# Patient Record
Sex: FEMALE | Race: WHITE | Employment: FULL TIME | URBAN - NONMETROPOLITAN AREA
[De-identification: names, ages, dates, MRNs, and addresses within clinical notes are randomized per-mention and may not be internally consistent; named-entity substitution may affect disease eponyms.]

---

## 2017-02-28 ENCOUNTER — TELEPHONE (OUTPATIENT)
Dept: ENDOCRINOLOGY | Age: 32
End: 2017-02-28

## 2019-01-31 ENCOUNTER — OFFICE VISIT (OUTPATIENT)
Dept: FAMILY MEDICINE CLINIC | Age: 34
End: 2019-01-31
Payer: MEDICARE

## 2019-01-31 VITALS
DIASTOLIC BLOOD PRESSURE: 72 MMHG | RESPIRATION RATE: 18 BRPM | TEMPERATURE: 98 F | BODY MASS INDEX: 21.99 KG/M2 | SYSTOLIC BLOOD PRESSURE: 112 MMHG | WEIGHT: 112 LBS | HEART RATE: 89 BPM | HEIGHT: 60 IN | OXYGEN SATURATION: 99 %

## 2019-01-31 DIAGNOSIS — B96.89 ACUTE BACTERIAL SINUSITIS: Primary | ICD-10-CM

## 2019-01-31 DIAGNOSIS — J01.90 ACUTE BACTERIAL SINUSITIS: Primary | ICD-10-CM

## 2019-01-31 PROCEDURE — G8420 CALC BMI NORM PARAMETERS: HCPCS | Performed by: NURSE PRACTITIONER

## 2019-01-31 PROCEDURE — 1036F TOBACCO NON-USER: CPT | Performed by: NURSE PRACTITIONER

## 2019-01-31 PROCEDURE — G8484 FLU IMMUNIZE NO ADMIN: HCPCS | Performed by: NURSE PRACTITIONER

## 2019-01-31 PROCEDURE — G8427 DOCREV CUR MEDS BY ELIG CLIN: HCPCS | Performed by: NURSE PRACTITIONER

## 2019-01-31 PROCEDURE — 99203 OFFICE O/P NEW LOW 30 MIN: CPT | Performed by: NURSE PRACTITIONER

## 2019-01-31 RX ORDER — BUPRENORPHINE HYDROCHLORIDE, NALOXONE HYDROCHLORIDE 8; 2 MG/1; MG/1
FILM, SOLUBLE BUCCAL; SUBLINGUAL
Refills: 0 | COMMUNITY
Start: 2018-11-12

## 2019-01-31 RX ORDER — PHENOL 1.4 %
AEROSOL, SPRAY (ML) MUCOUS MEMBRANE
COMMUNITY

## 2019-01-31 RX ORDER — AMOXICILLIN AND CLAVULANATE POTASSIUM 875; 125 MG/1; MG/1
1 TABLET, FILM COATED ORAL 2 TIMES DAILY
Qty: 14 TABLET | Refills: 0 | Status: SHIPPED | OUTPATIENT
Start: 2019-01-31 | End: 2019-02-07

## 2019-01-31 RX ORDER — IBUPROFEN 800 MG/1
800 TABLET ORAL EVERY 6 HOURS PRN
Refills: 0 | COMMUNITY
Start: 2018-12-21 | End: 2022-05-10 | Stop reason: SDUPTHER

## 2019-01-31 RX ORDER — LEVOTHYROXINE SODIUM 0.07 MG/1
75 TABLET ORAL DAILY
COMMUNITY
End: 2021-09-20 | Stop reason: SDUPTHER

## 2019-01-31 ASSESSMENT — ENCOUNTER SYMPTOMS
EYE PAIN: 0
COUGH: 1
SORE THROAT: 1
TROUBLE SWALLOWING: 0
CHEST TIGHTNESS: 1
ABDOMINAL DISTENTION: 0
EYE DISCHARGE: 1
RHINORRHEA: 1
WHEEZING: 0
EYE DISCHARGE: 0
SHORTNESS OF BREATH: 0
VOMITING: 0
DIARRHEA: 0
NAUSEA: 0
VOICE CHANGE: 0
EYE ITCHING: 0
EYE REDNESS: 0
COLOR CHANGE: 0
FACIAL SWELLING: 0
SINUS PRESSURE: 1
SINUS PAIN: 1
ABDOMINAL PAIN: 0
RECTAL PAIN: 0
CONSTIPATION: 0
NAUSEA: 1

## 2019-01-31 ASSESSMENT — PATIENT HEALTH QUESTIONNAIRE - PHQ9
2. FEELING DOWN, DEPRESSED OR HOPELESS: 0
1. LITTLE INTEREST OR PLEASURE IN DOING THINGS: 0
SUM OF ALL RESPONSES TO PHQ QUESTIONS 1-9: 0
SUM OF ALL RESPONSES TO PHQ9 QUESTIONS 1 & 2: 0
SUM OF ALL RESPONSES TO PHQ QUESTIONS 1-9: 0

## 2021-08-31 ENCOUNTER — VIRTUAL VISIT (OUTPATIENT)
Dept: FAMILY MEDICINE CLINIC | Age: 36
End: 2021-08-31
Payer: MEDICARE

## 2021-08-31 ENCOUNTER — NURSE TRIAGE (OUTPATIENT)
Dept: OTHER | Facility: CLINIC | Age: 36
End: 2021-08-31

## 2021-08-31 DIAGNOSIS — R52 BODY ACHES: ICD-10-CM

## 2021-08-31 DIAGNOSIS — R05.9 COUGH: Primary | ICD-10-CM

## 2021-08-31 PROCEDURE — G8421 BMI NOT CALCULATED: HCPCS | Performed by: NURSE PRACTITIONER

## 2021-08-31 PROCEDURE — 1036F TOBACCO NON-USER: CPT | Performed by: NURSE PRACTITIONER

## 2021-08-31 PROCEDURE — 99214 OFFICE O/P EST MOD 30 MIN: CPT | Performed by: NURSE PRACTITIONER

## 2021-08-31 PROCEDURE — G8427 DOCREV CUR MEDS BY ELIG CLIN: HCPCS | Performed by: NURSE PRACTITIONER

## 2021-08-31 RX ORDER — AZITHROMYCIN 250 MG/1
250 TABLET, FILM COATED ORAL SEE ADMIN INSTRUCTIONS
Qty: 6 TABLET | Refills: 0 | Status: SHIPPED | OUTPATIENT
Start: 2021-08-31 | End: 2021-09-05

## 2021-08-31 ASSESSMENT — PATIENT HEALTH QUESTIONNAIRE - PHQ9
SUM OF ALL RESPONSES TO PHQ QUESTIONS 1-9: 0
2. FEELING DOWN, DEPRESSED OR HOPELESS: 0
1. LITTLE INTEREST OR PLEASURE IN DOING THINGS: 0
SUM OF ALL RESPONSES TO PHQ9 QUESTIONS 1 & 2: 0

## 2021-08-31 ASSESSMENT — ENCOUNTER SYMPTOMS: COUGH: 1

## 2021-08-31 NOTE — LETTER
25 [facility] [address]  Phone: [phone]  Fax: [phone] [facility], APRN - CNP        August 31, 2021     Patient: Sukhwinder Gary   YOB: 1985   Date of Visit: 8/31/2021       To Whom It May Concern: It is my medical opinion that Chrissie Patel should remain out of work until [employer] are recieved. If you have any questions or concerns, please don't hesitate to call.     Sincerely,        Mena Pruitt, APRN - CNP

## 2021-08-31 NOTE — PROGRESS NOTES
[address] [address]  Dept: [phone]  Dept Fax: [phone]  Loc: [phone]      Sukhwinder Gary is a 29 y.o. female who presents todayfor Fever (x5 days), Congestion, and Fatigue      HPI:      Has been sick since Thursday took Zycam    Sunday felt worse, bodyaches, weakness. Started with sinus symptoms, now in chest.     Gets winded with increased activity    Had covid in December. Fever and chills. The patient is allergic to codeine and toradol [ketorolac tromethamine]. Past MedicalHistory  [facility]  has a past medical history of Fibromyalgia, Hashimoto's thyroiditis, Hypothyroidism, Kidney stone, Neuropathy, and Scoliosis. Medications    Current Outpatient Medications:     azithromycin (ZITHROMAX) 250 MG tablet, Take 1 tablet by mouth See Admin Instructions for 5 days 500mg on day 1 followed by 250mg on days 2 - 5, Disp: 6 tablet, Rfl: 0    levothyroxine (SYNTHROID) 75 MCG tablet, Take 75 mcg by mouth Daily, Disp: , Rfl:     ibuprofen (ADVIL;MOTRIN) 800 MG tablet, TALE 1 TABLET BY MOUTH THREE TIMES A DAY WITH FOOD, Disp: , Rfl: 0    SUBOXONE 8-2 MG FILM SL film, , Disp: , Rfl: 0    Multiple Vitamins-Minerals (MULTIVITAMIN WOMEN) TABS, Take by mouth, Disp: , Rfl:     Subjective:      Review of Systems   Constitutional: Positive for fever. HENT: Positive for congestion. Respiratory: Positive for cough. Gastrointestinal: Negative for diarrhea, nausea and vomiting. Musculoskeletal: Positive for myalgias. Skin: Negative for rash. Objective: There were no vitals filed for this visit. Physical Exam  Vitals and nursing note reviewed. Constitutional:       General: She is not in acute distress. Appearance: Normal appearance. She is well-developed. She is not diaphoretic. HENT:      Head: Normocephalic and atraumatic.       Right Ear: Tympanic membrane and external ear normal. Tympanic membrane is not injected or erythematous. Left Ear: Tympanic membrane and external ear normal. Tympanic membrane is not injected or erythematous. Nose: Congestion present. Mouth/Throat:      Pharynx: Oropharynx is clear. Uvula midline. Eyes:      General:         Right eye: No discharge. Left eye: No discharge. Conjunctiva/sclera: Conjunctivae normal.      Pupils: Pupils are equal, round, and reactive to light. Neck:      Thyroid: No thyromegaly. Trachea: Trachea normal.   Cardiovascular:      Rate and Rhythm: Normal rate and regular rhythm. Pulses: Normal pulses. Heart sounds: Normal heart sounds, S1 normal and S2 normal. No murmur heard. No friction rub. No gallop. Pulmonary:      Effort: Pulmonary effort is normal. No respiratory distress. Breath sounds: Normal breath sounds. No wheezing or rales. Comments: Harsh cough heard on exam    Chest:      Chest wall: No tenderness. Abdominal:      General: Bowel sounds are normal. There is no distension. Palpations: Abdomen is soft. There is no mass. Tenderness: There is no abdominal tenderness. There is no guarding or rebound. Musculoskeletal:         General: No tenderness or deformity. Normal range of motion. Cervical back: Full passive range of motion without pain, normal range of motion and neck supple. Lymphadenopathy:      Cervical: No cervical adenopathy. Skin:     General: Skin is warm and dry. Capillary Refill: Capillary refill takes less than 2 seconds. Neurological:      Mental Status: She is alert and oriented to person, place, and time. Deep Tendon Reflexes: Reflexes are normal and symmetric. Psychiatric:         Mood and Affect: Mood normal.         Behavior: Behavior normal.         Assessment/Plan: Alphonse Hussein was seen today for fever, congestion and fatigue.     Diagnoses and all orders for this visit:    Cough  -     COVID-19; Future  - azithromycin (ZITHROMAX) 250 MG tablet; Take 1 tablet by mouth See Admin Instructions for 5 days 500mg on day 1 followed by 250mg on days 2 - 5  -     COVID-19    Body aches  -     COVID-19; Future  -     COVID-19    Other orders  -     COVID-19, Rapid      Pt non toxic appearing and in no acute distress. Rx for zithromax  Advised to quarantine until results are received. Patient instructed to increase fluids and rest. Use Tylenol for fever or pain control. Good hand washing and Mask wearing encouraged. Monitor oxygen level with pulse oxymetry if able to obtain one, Pulse Ox should be above 92%. If SOB or difficulty breathing, go to the ER. Return in about 1 week (around 9/7/2021), or if symptoms worsen or fail to improve. Patient instructions given and reviewed.     Electronicallysigned by ERASMO Sosa CNP on 9/2/2021 at 3:49 PM

## 2021-08-31 NOTE — PATIENT INSTRUCTIONS
Patient Education        Learning About Coronavirus [phone])  What is coronavirus (COVID-19)? COVID-19 is a disease caused by a new type of coronavirus. This illness was first found in December 2019. It has since spread worldwide. Coronaviruses are a large group of viruses. They cause the common cold. They also cause more serious illnesses like Middle East respiratory syndrome (MERS) and severe acute respiratory syndrome (SARS). COVID-19 is caused by a novel coronavirus. That means it's a new type that has not been seen in people before. What are the symptoms? Coronavirus (COVID-19) symptoms may include:  · Fever. · Cough. · Trouble breathing. · Chills or repeated shaking with chills. · Muscle pain. · Headache. · Sore throat. · New loss of taste or smell. · Vomiting. · Diarrhea. In severe cases, COVID-19 can cause pneumonia and make it hard to breathe without help from a machine. It can cause death. How is it diagnosed? COVID-19 is diagnosed with a viral test. This may also be called a PCR test or antigen test. It looks for evidence of the virus in your breathing passages or lungs (respiratory system). The test is most often done on a sample from the nose, throat, or lungs. It's sometimes done on a sample of saliva. One way a sample is collected is by putting a long swab into the back of your nose. How is it treated? Mild cases of COVID-19 can be treated at home. Serious cases need treatment in the hospital. Treatment may include medicines to reduce symptoms, plus breathing support such as oxygen therapy or a ventilator. Some people may be placed on their belly to help their oxygen levels. Treatments that may help people who have COVID-19 include:  Antiviral medicines. These medicines treat viral infections. Remdesivir is an example. Immune-based therapy. These medicines help the immune system fight COVID-19. One example is bamlanivimab. It's a monoclonal antibody. Blood thinners. These medicines help prevent blood clots. People with severe illness are at risk for blood clots. How can you protect yourself and others? The best way to protect yourself from getting sick is to:  · Avoid areas where there is an outbreak. · Avoid contact with people who may be infected. · Avoid crowds and try to stay at least 6 feet away from other people. · Wash your hands often, especially after you cough or sneeze. Use soap and water, and scrub for at least 20 seconds. If soap and water aren't available, use an alcohol-based hand . · Avoid touching your mouth, nose, and eyes. To help avoid spreading the virus to others:  · Stay home if you are sick or have been exposed to the virus. Don't go to school, work, or public areas. And don't use public transportation, ride-shares, or taxis unless you have no choice. · Wear a cloth face cover if you have to go to public areas. · Cover your mouth with a tissue when you cough or sneeze. Then throw the tissue in the trash and wash your hands right away. · If you're sick:  ? Leave your home only if you need to get medical care. But call the doctor's office first so they know you're coming. And wear a face cover. ? Wear the face cover whenever you're around other people. It can help stop the spread of the virus. ? Limit contact with pets and people in your home. If possible, stay in a separate bedroom and use a separate bathroom. ? Clean and disinfect your home every day. Use household  and disinfectant wipes or sprays. Take special care to clean things that you grab with your hands. These include doorknobs, remote controls, phones, and handles on your refrigerator and microwave. And don't forget countertops, tabletops, bathrooms, and computer keyboards. When should you call for help? Call 911 anytime you think you may need emergency care.  For example, call if you have life-threatening symptoms, such as:    · You have severe trouble breathing. (You can't talk at all.)     · You have constant chest pain or pressure.     · You are severely dizzy or lightheaded.     · You are confused or can't think clearly.     · Your face and lips have a blue color.     · You pass out (lose consciousness) or are very hard to wake up. Call your doctor now or seek immediate medical care if:    · You have moderate trouble breathing. (You can't speak a full sentence.)     · You are coughing up blood (more than about 1 teaspoon).     · You have signs of low blood pressure. These include feeling lightheaded; being too weak to stand; and having cold, pale, clammy skin. Watch closely for changes in your health, and be sure to contact your doctor if:    · Your symptoms get worse.     · You are not getting better as expected. Call before you go to the doctor's office. Follow their instructions. And wear a cloth face cover. Current as of: March 26, 2021               Content Version: 12.9  © 2006-2021 [employer]. Care instructions adapted under license by [employer] . If you have questions about a medical condition or this instruction, always ask your healthcare professional. [place] [address] any warranty or liability for your use of this information. Patient Education        Coronavirus (YIAFK-21): Care Instructions  Overview  The coronavirus disease (COVID-19) is caused by a virus. Symptoms may include a fever, a cough, and shortness of breath. It mainly spreads person-to-person through droplets from coughing and sneezing. The virus also can spread when people are in close contact with someone who is infected. Most people have mild symptoms and can take care of themselves at home. If their symptoms get worse, they may need care in a hospital. Treatment may include medicines to reduce symptoms, plus breathing support such as oxygen therapy or a ventilator. It's important to not spread the virus to others.  If you have COVID-19, wear a face cover anytime you are around other people. It can help stop the spread of the virus. You need to isolate yourself while you are sick. Leave your home only if you need to get medical care or testing. Follow-up care is a key part of your treatment and safety. Be sure to make and go to all appointments, and call your doctor if you are having problems. It's also a good idea to know your test results and keep a list of the medicines you take. How can you care for yourself at home? · Get extra rest. It can help you feel better. · Drink plenty of fluids. This helps replace fluids lost from fever. Fluids also help ease a scratchy throat. Water, soup, fruit juice, and hot tea with lemon are good choices. · Take acetaminophen (such as Tylenol) to reduce a fever. It may also help with muscle aches. Read and follow all instructions on the label. · Use petroleum jelly on sore skin. This can help if the skin around your nose and lips becomes sore from rubbing a lot with tissues. If you use oxygen, use a water-based product instead of petroleum jelly. Tips for self-isolation  · Limit contact with people in your home. If possible, stay in a separate bedroom and use a separate bathroom. · Wear a cloth face cover when you are around other people. It can help stop the spread of the virus when you cough or sneeze. · If you have to leave home, avoid crowds and try to stay at least 6 feet away from other people. · Avoid contact with pets and other animals. · Cover your mouth and nose with a tissue when you cough or sneeze. Then throw it in the trash right away. · Wash your hands often, especially after you cough or sneeze. Use soap and water, and scrub for at least 20 seconds. If soap and water aren't available, use an alcohol-based hand . · Don't share personal household items. These include bedding, towels, cups and glasses, and eating utensils.   · [address] in the warmest water allowed 2021               Content Version: 12.9  © 0032-2087 [employer]. Care instructions adapted under license by [facility] ([facility]). If you have questions about a medical condition or this instruction, always ask your healthcare professional. [address] any warranty or liability for your use of this information.

## 2021-08-31 NOTE — TELEPHONE ENCOUNTER
Received call from [facility] with [employer]. Brief description of triage: fever, body aches, HA, and fatigue    Triage indicates for patient to be seen today or tomorrow    Care advice provided, patient verbalizes understanding; denies any other questions or concerns; instructed to call back for any new or worsening symptoms. Writer provided warm transfer to [facility] at [facility] for appointment scheduling. Attention Provider: Thank you for allowing me to participate in the care of your patient. The patient was connected to triage in response to information provided to the [facility]. Please do not respond through this encounter as the response is not directed to a shared pool. Reason for Disposition   Fever present > 3 days (72 hours)    Additional Information   Negative: Headache and stiff neck (can't touch chin to chest)     Yes to headache, denies stiff neck   Negative: Difficulty breathing     Feels winded when going up stairs but denies SOB    Answer Assessment - Initial Assessment Questions  1. TEMPERATURE: \"What is the most recent temperature? \"  \"How was it measured? \"       101 yesterday    2. ONSET: \"When did the fever start? \"       Last Friday    3. SYMPTOMS: \"Do you have any other symptoms besides the fever? \"  (e.g., colds, headache, sore throat, earache, cough, rash, diarrhea, vomiting, abdominal pain)      Fatigue, body aches    4. CAUSE: If there are no symptoms, ask: \"What do you think is causing the fever? \"       Pneumonia or COVID    5. CONTACTS: \"Does anyone else in the family have an infection? \"      Denies    6. TREATMENT: \"What have you done so far to treat this fever? \" (e.g., medications)      Tylenol and Motrin    7. IMMUNOCOMPROMISE: \"Do you have of the following: diabetes, HIV positive, splenectomy, cancer chemotherapy, chronic steroid treatment, transplant patient, etc.\"      Denies    8. PREGNANCY: \"Is there any chance you are pregnant? \" \"When was your last menstrual period? \"      Denies    9. TRAVEL: \"Have you traveled out of the country in the last month? \" (e.g., travel history, exposures)      Denies    Protocols used:  FEVER-ADULT-[place]

## 2021-09-02 LAB
SARS-COV-2: NOT DETECTED
SOURCE: NORMAL

## 2021-09-02 ASSESSMENT — ENCOUNTER SYMPTOMS
VOMITING: 0
DIARRHEA: 0
NAUSEA: 0

## 2021-09-13 ENCOUNTER — NURSE TRIAGE (OUTPATIENT)
Dept: OTHER | Facility: CLINIC | Age: 36
End: 2021-09-13

## 2021-09-13 ENCOUNTER — OFFICE VISIT (OUTPATIENT)
Dept: FAMILY MEDICINE CLINIC | Age: 36
End: 2021-09-13
Payer: MEDICARE

## 2021-09-13 VITALS
DIASTOLIC BLOOD PRESSURE: 74 MMHG | BODY MASS INDEX: 22.38 KG/M2 | WEIGHT: 114.6 LBS | RESPIRATION RATE: 16 BRPM | SYSTOLIC BLOOD PRESSURE: 110 MMHG | HEART RATE: 72 BPM | OXYGEN SATURATION: 98 %

## 2021-09-13 DIAGNOSIS — R07.9 CHEST PAIN, UNSPECIFIED TYPE: ICD-10-CM

## 2021-09-13 DIAGNOSIS — R05.9 COUGH: ICD-10-CM

## 2021-09-13 DIAGNOSIS — Z11.4 SCREENING FOR HIV (HUMAN IMMUNODEFICIENCY VIRUS): ICD-10-CM

## 2021-09-13 DIAGNOSIS — E06.3 HYPOTHYROIDISM DUE TO HASHIMOTO'S THYROIDITIS: ICD-10-CM

## 2021-09-13 DIAGNOSIS — Z11.59 NEED FOR HEPATITIS C SCREENING TEST: ICD-10-CM

## 2021-09-13 DIAGNOSIS — R06.00 DYSPNEA, UNSPECIFIED TYPE: ICD-10-CM

## 2021-09-13 DIAGNOSIS — Z13.220 SCREENING, LIPID: ICD-10-CM

## 2021-09-13 DIAGNOSIS — J20.9 ACUTE BRONCHITIS, UNSPECIFIED ORGANISM: ICD-10-CM

## 2021-09-13 DIAGNOSIS — Z11.52 ENCOUNTER FOR SCREENING FOR COVID-19: Primary | ICD-10-CM

## 2021-09-13 DIAGNOSIS — M25.50 ARTHRALGIA, UNSPECIFIED JOINT: ICD-10-CM

## 2021-09-13 DIAGNOSIS — E03.8 HYPOTHYROIDISM DUE TO HASHIMOTO'S THYROIDITIS: ICD-10-CM

## 2021-09-13 DIAGNOSIS — R76.8 POSITIVE ANA (ANTINUCLEAR ANTIBODY): ICD-10-CM

## 2021-09-13 LAB
INFLUENZA VIRUS A RNA: NEGATIVE
INFLUENZA VIRUS B RNA: NEGATIVE
Lab: NORMAL
QC PASS/FAIL: NORMAL
SARS-COV-2 RDRP RESP QL NAA+PROBE: NEGATIVE

## 2021-09-13 PROCEDURE — 1036F TOBACCO NON-USER: CPT | Performed by: FAMILY MEDICINE

## 2021-09-13 PROCEDURE — G8420 CALC BMI NORM PARAMETERS: HCPCS | Performed by: FAMILY MEDICINE

## 2021-09-13 PROCEDURE — 99214 OFFICE O/P EST MOD 30 MIN: CPT | Performed by: FAMILY MEDICINE

## 2021-09-13 PROCEDURE — 36415 COLL VENOUS BLD VENIPUNCTURE: CPT | Performed by: FAMILY MEDICINE

## 2021-09-13 PROCEDURE — 87635 SARS-COV-2 COVID-19 AMP PRB: CPT | Performed by: FAMILY MEDICINE

## 2021-09-13 PROCEDURE — 87502 INFLUENZA DNA AMP PROBE: CPT | Performed by: FAMILY MEDICINE

## 2021-09-13 PROCEDURE — G8427 DOCREV CUR MEDS BY ELIG CLIN: HCPCS | Performed by: FAMILY MEDICINE

## 2021-09-13 PROCEDURE — 93000 ELECTROCARDIOGRAM COMPLETE: CPT | Performed by: FAMILY MEDICINE

## 2021-09-13 RX ORDER — DOXYCYCLINE HYCLATE 100 MG
100 TABLET ORAL 2 TIMES DAILY
Qty: 20 TABLET | Refills: 0 | Status: SHIPPED | OUTPATIENT
Start: 2021-09-13 | End: 2021-09-20

## 2021-09-13 ASSESSMENT — ENCOUNTER SYMPTOMS
WHEEZING: 0
VOMITING: 0
SORE THROAT: 1
HEMOPTYSIS: 0
ORTHOPNEA: 0
SPUTUM PRODUCTION: 0
SHORTNESS OF BREATH: 1
ABDOMINAL PAIN: 0
RHINORRHEA: 1
SWOLLEN GLANDS: 0

## 2021-09-13 NOTE — PROGRESS NOTES
[address] [address]  Dept: [phone]  Dept Fax: [phone]  Loc: [phone]      Catina Juarez is a 29 y.o. female who presents todayfor her medical conditions/complaints as noted below. Catina Juarez is c/o of Cough ( last night ), Shortness of Breath, Chest Pain (when laying down at night ), Headache, and Chills      :     Shortness of Breath  This is a new problem. The current episode started 1 to 4 weeks ago. The problem occurs constantly. The problem has been gradually worsening. Associated symptoms include chest pain (started around 2 weeks ago), headaches, rhinorrhea and a sore throat. Pertinent negatives include no abdominal pain, claudication, coryza, ear pain, fever (but chills), hemoptysis, leg pain, leg swelling, neck pain, orthopnea, PND, rash, sputum production, swollen glands, syncope, vomiting or wheezing. Associated symptoms comments: Worse with laying flat. The patient has no known risk factors for DVT/PE. Treatments tried: antibiotics, inhaler (made her feel worse); tried tylenol and decongestant. Her past medical history is significant for pneumonia. There is no history of allergies, aspirin allergies, asthma, bronchiolitis, CAD, chronic lung disease, DVT, a heart failure, PE or a recent surgery. Had COVID-19 around [place]. Symptom for about 3 months after. Has been in contact with 4 positive COVID cases recently. Felt better on last day of antibiotics. Z-pack. Then last night started feeling worse. Just feels swollen when she goes to swallow. Poor appetite. Achy everywhere. ? Diagnosis of Lupus. Suboxone doctor ran testing and she was negative. No hx IVDU. Was on rx pain meds for scoliosis. Does have a history of mass in left upper lobe; thought scarring from prior pneumonia/pleurisy. Has not gotten COVID-19 vaccine yet. Always has joint pains. Patient Active Problem List   Diagnosis    Hypothyroidism    Chronic fatigue    Hypoglycemia     Goals    None       The patient is allergic to codeine and toradol [ketorolac tromethamine]. Medical History  Enrike Gaviria has a past medical history of Fibromyalgia, Hashimoto's thyroiditis, Hypothyroidism, Kidney stone, Neuropathy, and Scoliosis. Past SurgicalHistory  The patient  has a past surgical history that includes [place] tooth extraction. Family History  This patient's family history includes High Blood Pressure in her sister. Social History  Enrike Gaviria  reports that she has quit smoking. She has never used smokeless tobacco. She reports previous drug use. She reports that she does not drink alcohol. Medications    Current Outpatient Medications:     doxycycline hyclate (VIBRA-TABS) 100 MG tablet, Take 1 tablet by mouth 2 times daily for 10 days, Disp: 20 tablet, Rfl: 0    levothyroxine (SYNTHROID) 75 MCG tablet, Take 75 mcg by mouth Daily, Disp: , Rfl:     ibuprofen (ADVIL;MOTRIN) 800 MG tablet, TALE 1 TABLET BY MOUTH THREE TIMES A DAY WITH FOOD, Disp: , Rfl: 0    SUBOXONE 8-2 MG FILM SL film, , Disp: , Rfl: 0    Multiple Vitamins-Minerals (MULTIVITAMIN WOMEN) TABS, Take by mouth, Disp: , Rfl:     Subjective:      Review of Systems   Constitutional: Negative for fever (but chills). HENT: Positive for rhinorrhea and sore throat. Negative for ear pain. Respiratory: Positive for shortness of breath. Negative for hemoptysis, sputum production and wheezing. Cardiovascular: Positive for chest pain (started around 2 weeks ago). Negative for orthopnea, claudication, leg swelling, syncope and PND. Gastrointestinal: Negative for abdominal pain and vomiting. Musculoskeletal: Negative for neck pain. Skin: Negative for rash. Neurological: Positive for headaches.        Objective:     Vitals:    09/13/21 1316   BP: 110/74   Site: Left Upper Arm   Pulse: 72   Resp: 16   SpO2: 98%   Weight: 114 lb by mouth 2 times daily for 10 days  Dispense: 20 tablet; Refill: 0    3. Encounter for screening for COVID-19  As per #1.   - POCT COVID-19, Rapid    4. Chest pain, unspecified type  EKG and CXR reassuring. As per #1.   - EKG 12 Lead; Future  - EKG 12 Lead    5. Dyspnea, unspecified type  EKG and CXR reassuring. As per #1.   - XR CHEST STANDARD (2 VW); Future  - XR CHEST STANDARD (2 VW)    6. Arthralgia, unspecified joint  Uncertain diagnosis. Checking labs. - DIANN Screen With Reflex; Future  - Rheumatoid Factor; Future  - CCP Antibodies, IGG/IGA; Future  - Sedimentation Rate; Future  - C-Reactive Protein; Future  - C-Reactive Protein  - Sedimentation Rate  - CCP Antibodies, IGG/IGA  - Rheumatoid Factor  - DIANN Screen With Reflex  - Can[address]igo [address], [place], DO, Rheumatology, [place]    7. Positive DIANN (antinuclear antibody)  Addendum: DIANN came back positive. Referring to rheumatology. - [address], [place], DO, Rheumatology, [place]    8. Hypothyroidism due to Hashimoto's thyroiditis  Checking TSH. Will adjust dose if needed in follow-up. - CBC Auto Differential; Future  - TSH With Reflex Ft4; Future  - Comprehensive Metabolic Panel; Future  - Comprehensive Metabolic Panel  - TSH With Reflex Ft4  - CBC Auto Differential    9. Screening, lipid  Screen. - Lipid, Fasting; Future  - Lipid, Fasting    10. Screening for HIV (human immunodeficiency virus)  Screen. - HIV-1 and HIV-2 Antibodies; Future  - HIV-1 and HIV-2 Antibodies    11. Need for hepatitis C screening test  Screen. - Hepatitis C Antibody; Future  - Hepatitis C Antibody        Return in about 1 week (around 9/20/2021) for re-check cough.     Orders Placed   Orders Placed This Encounter   Procedures    XR CHEST STANDARD (2 VW)     Standing Status:   Future     Number of Occurrences:   1     Standing Expiration Date:   9/13/2022     Order Specific Question:   Reason for exam:     Answer:   short of breath    CBC Auto Differential     Standing Status: Future     Number of Occurrences:   1     Standing Expiration Date:   9/13/2022    TSH With Reflex Ft4     Standing Status:   Future     Number of Occurrences:   1     Standing Expiration Date:   9/13/2022    HIV-1 and HIV-2 Antibodies     Standing Status:   Future     Number of Occurrences:   1     Standing Expiration Date:   9/13/2022    Hepatitis C Antibody     Standing Status:   Future     Number of Occurrences:   1     Standing Expiration Date:   9/13/2022    Lipid, Fasting     Standing Status:   Future     Number of Occurrences:   1     Standing Expiration Date:   9/13/2022    Comprehensive Metabolic Panel     Standing Status:   Future     Number of Occurrences:   1     Standing Expiration Date:   9/13/2022    DIANN Screen With Reflex     Standing Status:   Future     Number of Occurrences:   1     Standing Expiration Date:   9/13/2022    Rheumatoid Factor     Standing Status:   Future     Number of Occurrences:   1     Standing Expiration Date:   9/13/2022    CCP Antibodies, IGG/IGA     Standing Status:   Future     Number of Occurrences:   1     Standing Expiration Date:   9/13/2022    Sedimentation Rate     Standing Status:   Future     Number of Occurrences:   1     Standing Expiration Date:   9/13/2022    C-Reactive Protein     Standing Status:   Future     Number of Occurrences:   1     Standing Expiration Date:   9/13/2022    T4, Free    Anion Gap    Glomerular Filtration Rate, Estimated    Antinuclear AB, Hep-2, IGG    Antinuclear AB, Single Pattern   Nilesh Daniel, DO Anuj, Rheumat[facility], [facility]     Referral Priority:   Urgent     Referral Type:   Eval and Treat     Referral Reason:   Specialty Services Required     Referred to Provider:   Maricruz Scott DO     Requested Specialty:   Rheumatology     Number of Visits Requested:   1    POCT COVID-19, Rapid     Order Specific Question:   Is this test for diagnosis or screening?      Answer:   Screening     Order Specific Question: Symptomatic for COVID-19 as defined by CDC? Answer:   Yes     Order Specific Question:   Date of Symptom Onset     Answer:   9/12/2021     Order Specific Question:   Hospitalized for COVID-19? Answer:   No     Order Specific Question:   Admitted to ICU for COVID-19? Answer:   No     Order Specific Question:   Employed in healthcare setting? Answer:   No     Order Specific Question:   Resident in a congregate (group) care setting? Answer:   No     Order Specific Question:   Pregnant? Answer:   No     Order Specific Question:   Previously tested for COVID-19? Answer: Yes    POCT Influenza A/B DNA (Ale[place] i)    EKG 12 Lead     Standing Status:   Future     Number of Occurrences:   1     Standing Expiration Date:   9/13/2022     Order Specific Question:   Reason for Exam?     Answer:   Chest pain       Prescriptions given/sent  Orders Placed This Encounter   Medications    doxycycline hyclate (VIBRA-TABS) 100 MG tablet     Sig: Take 1 tablet by mouth 2 times daily for 10 days     Dispense:  20 tablet     Refill:  0       Patient given educational materials - see patient instructions. Discussed use, benefit, and side effects of prescribed medications. All patientquestions answered. Pt voiced understanding. Reviewed health maintenance.             Electronically signed by Yaritza Lay MD on 9/18/2021 at 8:23 AM

## 2021-09-13 NOTE — TELEPHONE ENCOUNTER
Received call from Wesly Cunningham at [facility] with [employer] Complaint. Brief description of triage: Pt reports having dry cough, constant SOB, chest pain when not coughing since last night. States feels heavy. Has been exposed to covid within the last week and is concerned about covid. Triage indicates for patient to go to ER. Pt declines, states she wants to see PCP and have covid test. Explained urgent symptoms needed to be treated. Continues to decline ER disposition. TC to [place] , spoke to Dr. Marci Lombard. States she will see pt in the office. Instructed for Andreea to setup appt. Care advice provided, patient verbalizes understanding; denies any other questions or concerns; instructed to call back for any new or worsening symptoms. Writer provided warm transfer to Darrell at [facility] PCP in [facility] [place] for appointment scheduling. Attention Provider: Thank you for allowing me to participate in the care of your patient. The patient was connected to triage in response to information provided to the [facility]. Please do not respond through this encounter as the response is not directed to a shared pool. Reason for Disposition   Chest pain present when not coughing    Answer Assessment - Initial Assessment Questions  1. ONSET: \"When did the cough begin? \"       Last night 9/12/21    2. SEVERITY: \"How bad is the cough today? \"       Dry cough     3. RESPIRATORY DISTRESS: \"Describe your breathing. \"       SOB     4. FEVER: \"Do you have a fever? \" If so, ask: \"What is your temperature, how was it measured, and when did it start? \"      No fever    5. HEMOPTYSIS: \"Are you coughing up any blood? \" If so ask: \"How much? \" (flecks, streaks, tablespoons, etc.)      None    6. TREATMENT: \"What have you done so far to treat the cough? \" (e.g., meds, fluids, humidifier)      Decongestant    7. CARDIAC HISTORY: \"Do you have any history of heart disease? \" (e.g., heart attack, congestive heart failure)       Tachycardia    8. LUNG HISTORY: \"Do you have any history of lung disease? \"  (e.g., pulmonary embolus, asthma, emphysema)      Denies    9. PE RISK FACTORS: \"Do you have a history of blood clots? \" (or: recent major surgery, recent prolonged travel, bedridden)      Denies    10. OTHER SYMPTOMS: \"Do you have any other symptoms? (e.g., runny nose, wheezing, chest pain)        Chills    11. PREGNANCY: \"Is there any chance you are pregnant? \" \"When was your last menstrual period? \"        LMP 8/13/21    12. TRAVEL: \"Have you traveled out of the country in the last month? \" (e.g., travel history, exposures)        No recent travel    Protocols used: COUGH-ADULT-[place]

## 2021-09-14 LAB
ALBUMIN SERPL-MCNC: 4.8 G/DL (ref 3.5–5.1)
ALP BLD-CCNC: 47 U/L (ref 38–126)
ALT SERPL-CCNC: 11 U/L (ref 11–66)
ANION GAP SERPL CALCULATED.3IONS-SCNC: 11 MEQ/L (ref 8–16)
AST SERPL-CCNC: 15 U/L (ref 5–40)
BASOPHILS # BLD: 1 %
BASOPHILS ABSOLUTE: 0.1 THOU/MM3 (ref 0–0.1)
BILIRUB SERPL-MCNC: 0.4 MG/DL (ref 0.3–1.2)
BUN BLDV-MCNC: 16 MG/DL (ref 7–22)
C-REACTIVE PROTEIN: < 0.3 MG/DL (ref 0–1)
CALCIUM SERPL-MCNC: 9.5 MG/DL (ref 8.5–10.5)
CHLORIDE BLD-SCNC: 103 MEQ/L (ref 98–111)
CHOLESTEROL, FASTING: 215 MG/DL (ref 100–199)
CO2: 25 MEQ/L (ref 23–33)
CREAT SERPL-MCNC: 0.7 MG/DL (ref 0.4–1.2)
EOSINOPHIL # BLD: 2.3 %
EOSINOPHILS ABSOLUTE: 0.2 THOU/MM3 (ref 0–0.4)
ERYTHROCYTE [DISTWIDTH] IN BLOOD BY AUTOMATED COUNT: 12.6 % (ref 11.5–14.5)
ERYTHROCYTE [DISTWIDTH] IN BLOOD BY AUTOMATED COUNT: 44.8 FL (ref 35–45)
GFR SERPL CREATININE-BSD FRML MDRD: > 90 ML/MIN/1.73M2
GLUCOSE BLD-MCNC: 80 MG/DL (ref 70–108)
HCT VFR BLD CALC: 43.5 % (ref 37–47)
HDLC SERPL-MCNC: 92 MG/DL
HEMOGLOBIN: 13.5 GM/DL (ref 12–16)
HEPATITIS C ANTIBODY: NEGATIVE
HIV AG/AB: NONREACTIVE
IMMATURE GRANS (ABS): 0.12 THOU/MM3 (ref 0–0.07)
IMMATURE GRANULOCYTES: 1.8 %
LDL CHOLESTEROL CALCULATED: 116 MG/DL
LYMPHOCYTES # BLD: 14.8 %
LYMPHOCYTES ABSOLUTE: 1 THOU/MM3 (ref 1–4.8)
MCH RBC QN AUTO: 29.9 PG (ref 26–33)
MCHC RBC AUTO-ENTMCNC: 31 GM/DL (ref 32.2–35.5)
MCV RBC AUTO: 96.5 FL (ref 81–99)
MONOCYTES # BLD: 7.9 %
MONOCYTES ABSOLUTE: 0.5 THOU/MM3 (ref 0.4–1.3)
NUCLEATED RED BLOOD CELLS: 0 /100 WBC
PLATELET # BLD: 246 THOU/MM3 (ref 130–400)
PMV BLD AUTO: 10.7 FL (ref 9.4–12.4)
POTASSIUM SERPL-SCNC: 4.1 MEQ/L (ref 3.5–5.2)
RBC # BLD: 4.51 MILL/MM3 (ref 4.2–5.4)
RHEUMATOID FACTOR: < 10 IU/ML (ref 0–13)
SEDIMENTATION RATE, ERYTHROCYTE: 0 MM/HR (ref 0–20)
SEG NEUTROPHILS: 72.2 %
SEGMENTED NEUTROPHILS ABSOLUTE COUNT: 4.9 THOU/MM3 (ref 1.8–7.7)
SODIUM BLD-SCNC: 139 MEQ/L (ref 135–145)
T4 FREE: 0.92 NG/DL (ref 0.93–1.76)
TOTAL PROTEIN: 7.3 G/DL (ref 6.1–8)
TRIGLYCERIDE, FASTING: 35 MG/DL (ref 0–199)
TSH SERPL DL<=0.05 MIU/L-ACNC: 14.81 UIU/ML (ref 0.4–4.2)
WBC # BLD: 6.8 THOU/MM3 (ref 4.8–10.8)

## 2021-09-16 LAB — ANA SCREEN: DETECTED

## 2021-09-17 LAB
ANA PATTERN: ABNORMAL
ANA TITER: ABNORMAL
ANTINUCLEAR ANTIBODY, HEP-2, IGG: DETECTED

## 2021-09-20 ENCOUNTER — HOSPITAL ENCOUNTER (OUTPATIENT)
Age: 36
Setting detail: SPECIMEN
Discharge: HOME | End: 2021-09-20
Payer: MEDICARE

## 2021-09-20 ENCOUNTER — OFFICE VISIT (OUTPATIENT)
Dept: FAMILY MEDICINE CLINIC | Age: 36
End: 2021-09-20
Payer: MEDICARE

## 2021-09-20 ENCOUNTER — TELEPHONE (OUTPATIENT)
Dept: FAMILY MEDICINE CLINIC | Age: 36
End: 2021-09-20

## 2021-09-20 VITALS
HEART RATE: 78 BPM | OXYGEN SATURATION: 97 % | WEIGHT: 113 LBS | HEIGHT: 60 IN | DIASTOLIC BLOOD PRESSURE: 64 MMHG | TEMPERATURE: 98.6 F | SYSTOLIC BLOOD PRESSURE: 108 MMHG | RESPIRATION RATE: 16 BRPM | BODY MASS INDEX: 22.19 KG/M2

## 2021-09-20 DIAGNOSIS — E03.8 HYPOTHYROIDISM DUE TO HASHIMOTO'S THYROIDITIS: Primary | ICD-10-CM

## 2021-09-20 DIAGNOSIS — E06.3 HYPOTHYROIDISM DUE TO HASHIMOTO'S THYROIDITIS: Primary | ICD-10-CM

## 2021-09-20 DIAGNOSIS — R00.2 HEART PALPITATIONS: ICD-10-CM

## 2021-09-20 DIAGNOSIS — Z87.42 HISTORY OF ACUTE PID: ICD-10-CM

## 2021-09-20 DIAGNOSIS — Z12.4 SCREENING FOR CERVICAL CANCER: ICD-10-CM

## 2021-09-20 DIAGNOSIS — R76.8 POSITIVE ANA (ANTINUCLEAR ANTIBODY): ICD-10-CM

## 2021-09-20 DIAGNOSIS — R06.00 DYSPNEA, UNSPECIFIED TYPE: ICD-10-CM

## 2021-09-20 DIAGNOSIS — J20.9 ACUTE BRONCHITIS, UNSPECIFIED ORGANISM: ICD-10-CM

## 2021-09-20 PROCEDURE — G8420 CALC BMI NORM PARAMETERS: HCPCS | Performed by: FAMILY MEDICINE

## 2021-09-20 PROCEDURE — 99214 OFFICE O/P EST MOD 30 MIN: CPT | Performed by: FAMILY MEDICINE

## 2021-09-20 PROCEDURE — 36415 COLL VENOUS BLD VENIPUNCTURE: CPT | Performed by: FAMILY MEDICINE

## 2021-09-20 PROCEDURE — 1036F TOBACCO NON-USER: CPT | Performed by: FAMILY MEDICINE

## 2021-09-20 PROCEDURE — G8427 DOCREV CUR MEDS BY ELIG CLIN: HCPCS | Performed by: FAMILY MEDICINE

## 2021-09-20 RX ORDER — LEVOTHYROXINE SODIUM 0.05 MG/1
50 TABLET ORAL DAILY
Qty: 60 TABLET | Refills: 0 | Status: SHIPPED | OUTPATIENT
Start: 2021-09-20 | End: 2021-10-18

## 2021-09-20 RX ORDER — LEVOTHYROXINE SODIUM 0.05 MG/1
75 TABLET ORAL DAILY
Qty: 60 TABLET | Refills: 0 | Status: SHIPPED | OUTPATIENT
Start: 2021-09-20 | End: 2021-09-20 | Stop reason: SDUPTHER

## 2021-09-20 ASSESSMENT — ENCOUNTER SYMPTOMS
SORE THROAT: 0
ABDOMINAL PAIN: 0
CHEST TIGHTNESS: 1
BACK PAIN: 0
CONSTIPATION: 0
DIARRHEA: 0
SHORTNESS OF BREATH: 1
NAUSEA: 1
COUGH: 0
VOMITING: 0
EYE REDNESS: 0
EYE DISCHARGE: 0

## 2021-09-20 NOTE — PROGRESS NOTES
[address] [address]  Dept: [phone]  Dept Fax: [phone]  Loc: [phone]      Brett Ewing is a 29 y.o. female who presents todayfor her medical conditions/complaints as noted below. Brett Ewing is c/o of Follow-up and Discuss Labs      :     HPI     Here for follow-up today. Some symptoms are better. Not feverish. Deep pain and congestion is not as intense. Feels like \"I got over a sickness\" but still not well. DIANN positive 1:640. Also hypothyroidism not at goal.      Lungs still do not feel well. Almost like she inhaled fluid. A bit of pain in left upper lobe. Symptoms come and go. On synthroid for thyroid. No missed doses. Wanted to see specialist in [place] but keeps getting sick. Reports she has a hard time with synthroid. When she increases this her heart races and headaches get worse. Has been breaking 75 mcg tablets in half as a results. On suboxone. In remission. Has Mirena removed. Due for pap. Does get a lot of discomfort there too. No symptoms. No concern for STI. History of PID. Was seen in ER. Crystal was accused of cheating. But was not sexually active. Patient Active Problem List   Diagnosis    Hypothyroidism    Chronic fatigue    Hypoglycemia     Goals    None       The patient is allergic to codeine. Medical History  Jo-Ann Patterson has a past medical history of Fibromyalgia, Hashimoto's thyroiditis, Hypothyroidism, Kidney stone, Neuropathy, and Scoliosis. Past SurgicalHistory  The patient  has a past surgical history that includes [place] tooth extraction. Family History  This patient's family history includes High Blood Pressure in her sister. Social History  Jo-Ann Patterson  reports that she has quit smoking. She has never used smokeless tobacco. She reports previous drug use. She reports that she does not drink alcohol.     Medications    Current Outpatient Medications:     levothyroxine (SYNTHROID) 50 MCG tablet, Take 1.5 tablets by mouth Daily, Disp: 60 tablet, Rfl: 0    ibuprofen (ADVIL;MOTRIN) 800 MG tablet, TALE 1 TABLET BY MOUTH THREE TIMES A DAY WITH FOOD, Disp: , Rfl: 0    SUBOXONE 8-2 MG FILM SL film, , Disp: , Rfl: 0    Multiple Vitamins-Minerals (MULTIVITAMIN WOMEN) TABS, Take by mouth, Disp: , Rfl:     Subjective:      Review of Systems   Constitutional: Positive for appetite change. Negative for chills, fever and unexpected weight change. Fatigue: mild. HENT: Positive for congestion. Negative for ear discharge, ear pain, hearing loss and sore throat. Eyes: Negative for discharge and redness. Respiratory: Positive for chest tightness and shortness of breath. Negative for cough. Cardiovascular: Positive for chest pain and palpitations (has had these whole life; worse when laying down). Gastrointestinal: Positive for nausea (with antibiotic). Negative for abdominal pain, constipation, diarrhea and vomiting. Genitourinary: Negative for difficulty urinating and dysuria. Musculoskeletal: Positive for arthralgias (all over; worse shoulders, neck hips) and neck pain. Negative for back pain and gait problem. Skin: Negative for rash (sometimes gets malar rash; not now). Allergic/Immunologic: Negative for environmental allergies. Neurological: Positive for headaches (mild at present). Psychiatric/Behavioral: Positive for sleep disturbance (poor sleep last night; gets sleep paralysis). Negative for dysphoric mood. The patient is not nervous/anxious. Objective:     Vitals:    09/20/21 1451   BP: 108/64   Site: Left Upper Arm   Position: Sitting   Pulse: 78   Resp: 16   Temp: 98.6 °F (37 °C)   TempSrc: Temporal   SpO2: 97%   Weight: 113 lb (51.3 kg)   Height: 5' (1.524 m)       Physical Exam  Vitals reviewed. Exam conducted with a chaperone present. Constitutional:       Appearance: She is well-developed.    HENT: Head: Normocephalic and atraumatic. Eyes:      Conjunctiva/sclera: Conjunctivae normal.      Pupils: Pupils are equal, round, and reactive to light. Neck:      Thyroid: No thyromegaly. Cardiovascular:      Rate and Rhythm: Normal rate and regular rhythm. Heart sounds: Normal heart sounds. Pulmonary:      Effort: Pulmonary effort is normal. No respiratory distress. Breath sounds: Normal breath sounds. Abdominal:      Palpations: Abdomen is soft. Tenderness: There is no abdominal tenderness. Hernia: There is no hernia in the left inguinal area or right inguinal area. Genitourinary:     Labia:         Right: No rash, tenderness, lesion or injury. Left: No rash, tenderness, lesion or injury. Vagina: No signs of injury and foreign body. Vaginal discharge present. No erythema, tenderness, bleeding, lesions or prolapsed vaginal walls. Cervix: Eversion present. Uterus: Normal.       Adnexa: Right adnexa normal and left adnexa normal.      Comments: Copious clear stretchy vaginal discharge may be physiologic related to ovulation versus other. Cervical ectropion noted. No cervical motion tenderness. Musculoskeletal:      Cervical back: Neck supple. Lymphadenopathy:      Cervical: No cervical adenopathy. Lower Body: No right inguinal adenopathy. No left inguinal adenopathy. Skin:     General: Skin is warm and dry. Findings: No rash. Neurological:      Mental Status: She is alert. Comments: No obvious focal deficit. Psychiatric:         Attention and Perception: Attention normal.         Speech: Speech normal.         Behavior: Behavior normal.         Thought Content: Thought content normal.         Cognition and Memory: Cognition normal.      Comments: Somewhat anxious about symptoms. Appropriate.           Lab Results   Component Value Date    WBC 6.8 09/13/2021    HGB 13.5 09/13/2021    HCT 43.5 09/13/2021     09/13/2021    HDL 92 09/13/2021    ALT 11 09/13/2021    AST 15 09/13/2021     09/13/2021    K 4.1 09/13/2021     09/13/2021    CREATININE 0.7 09/13/2021    BUN 16 09/13/2021    CO2 25 09/13/2021    TSH 14.810 (H) 09/13/2021    LABA1C 4.9 09/16/2015       [facility]:   1. Acute bronchitis, unspecified organism  Improved. D/C antibiotic now since completed 1 week therapy, improved, lungs clear, and causing nausea. 2. Positive DIANN (antinuclear antibody)  Referred to rheumatology. This may be the cause of several of symptoms. 3. Dyspnea, unspecified type  Possibly secondary to #2 versus other. Referred to rheumatology. Indications for prompt return and/or emergent presentation if worsening reviewed in detail. 4. Heart palpitations  Will check holter. - Holter Monitor 24 Hour; Future    5. Hypothyroidism due to Hashimoto's thyroiditis  Adjusted medication. Only taking half of 75 mg tablet so this is a   - levothyroxine (SYNTHROID) 50 MCG tablet; Take 1.5 tablets by mouth Daily  Dispense: 60 tablet; Refill: 0  - TSH With Reflex Ft4; Future  - TSH With Reflex Ft4    6. History of acute PID  Will check labs to confirm now normal.    - Vaginal Pathogens DNA Panel; Future  - C. Trachomatis / N. Gonorrhoeae, DNA Probe  - Vaginal Pathogens DNA Panel    7. Screening for cervical cancer  Screen. - PAP SMEAR        Return in about 6 weeks (around 11/1/2021) for re-check thyroid. Orders Placed   Orders Placed This Encounter   Procedures    Vaginal Pathogens DNA Panel     Standing Status:   Future     Number of Occurrences:   1     Standing Expiration Date:   9/20/2022    C. Trachomatis / N. Gonorrhoeae, DNA Probe    PAP SMEAR     Patient History:    Patient's last menstrual period was 09/12/2021 (approximate).   OBGYN Status: Having periods  Past Surgical History:  No date: WISDOM TOOTH EXTRACTION      Social History    Tobacco Use      Smoking status: Former Smoker      Smokeless tobacco: Never Used       Order Specific Question:   Collection Type     Answer: Thin Prep     Order Specific Question:   Prior Abnormal Pap Test     Answer:   No     Order Specific Question:   Screening or Diagnostic     Answer:   Screening     Order Specific Question:   HPV Requested? Answer:   Yes     Order Specific Question:   High Risk Patient     Answer:   N/A    TSH With Reflex Ft4     Standing Status:   Future     Number of Occurrences:   1     Standing Expiration Date:   9/20/2022    Holter Monitor 24 Hour     Standing Status:   Future     Standing Expiration Date:   9/20/2022     Order Specific Question:   Reason for Exam?     Answer:   Irregular heart rate       Prescriptions given/sent  Orders Placed This Encounter   Medications    levothyroxine (SYNTHROID) 50 MCG tablet     Sig: Take 1.5 tablets by mouth Daily     Dispense:  60 tablet     Refill:  0       Patient given educational materials - see patient instructions. Discussed use, benefit, and side effects of recommended medications. All patient questions answered. Pt voiced understanding. Reviewed health maintenance - encourage vaccinations.           Electronically signed by Zehra Roblero MD on 9/20/2021 at 11:44 PM

## 2021-09-21 LAB
CT/NG SOURCE: NORMAL
T4 FREE: 1 NG/DL (ref 0.93–1.76)
TSH SERPL DL<=0.05 MIU/L-ACNC: 20.07 UIU/ML (ref 0.4–4.2)

## 2021-09-21 NOTE — TELEPHONE ENCOUNTER
In case there is confusion, Sabina Horne has cervical ectropion as we discussed, which is a normal variant that can cause some bleeding with intercourse and mucus. She does NOT have ectropion of her eye, which somehow printed in her patient paperwork but is an entirely different diagnosis. Thanks. Okay to wait to call to clarify until pap results are back. Confirm that she is now taking one 50 mcg tablet of synthroid daily.

## 2021-09-21 NOTE — TELEPHONE ENCOUNTER
Patient did state she noticed the paper was incorrect will access mychart and can always  a new AVS at office if she wishes. To clarify she is now taking one 50mcg tablet of synthroid daily.

## 2021-09-23 DIAGNOSIS — E03.8 HYPOTHYROIDISM DUE TO HASHIMOTO'S THYROIDITIS: Primary | ICD-10-CM

## 2021-09-23 DIAGNOSIS — E06.3 HYPOTHYROIDISM DUE TO HASHIMOTO'S THYROIDITIS: Primary | ICD-10-CM

## 2021-09-23 LAB
CANDIDA SPECIES, DNA PROBE: POSITIVE
GARDNERELLA VAGINALIS, DNA PROBE: NEGATIVE
TRICHOMONAS VAGINALIS DNA: NEGATIVE

## 2021-09-26 DIAGNOSIS — B37.31 VAGINAL CANDIDA: Primary | ICD-10-CM

## 2021-09-26 RX ORDER — FLUCONAZOLE 150 MG/1
150 TABLET ORAL
Qty: 3 TABLET | Refills: 0 | Status: SHIPPED | OUTPATIENT
Start: 2021-09-26 | End: 2021-11-23

## 2021-09-28 ENCOUNTER — HOSPITAL ENCOUNTER (OUTPATIENT)
Dept: NON INVASIVE DIAGNOSTICS | Age: 36
Discharge: HOME | End: 2021-09-28
Payer: MEDICARE

## 2021-09-28 DIAGNOSIS — R00.2 HEART PALPITATIONS: ICD-10-CM

## 2021-09-28 PROCEDURE — 93225 XTRNL ECG REC<48 HRS REC: CPT

## 2021-09-28 PROCEDURE — 93226 XTRNL ECG REC<48 HR SCAN A/R: CPT

## 2021-09-28 NOTE — PROCEDURES
The skin was prepped and a 24 hr holter monitor was applied. The patient was instructed on the documentation of symptoms and the purpose of the holter as well as the things to avoid while wearing the holter. The patient was instructed to remove and return the holter on 9/29/21 . The serial number of the holter that was applied is [id number]. Complete Blood Count + Automated Diff (12.24.19 @ 19:29)    WBC Count: 2.62 K/uL    RBC Count: 4.83 M/uL    Hemoglobin: 13.9 g/dL    Hematocrit: 41.8 %    Mean Cell Volume: 86.5 fL    Mean Cell Hemoglobin: 28.8 pg    Mean Cell Hemoglobin Conc: 33.3 g/dL    Red Cell Distrib Width: 12.2 %    Platelet Count - Automated: 148 K/uL    Auto Neutrophil #: 1.98 K/uL    Auto Lymphocyte #: 0.30 K/uL    Auto Monocyte #: 0.02 K/uL    Auto Eosinophil #: 0.04 K/uL    Auto Basophil #: 0.00 K/uL    Auto Neutrophil %: 72.2: Differential percentages must be correlated with absolute numbers for  clinical significance. %    Auto Lymphocyte %: 11.3 %    Auto Monocyte %: 0.8 %    Auto Eosinophil %: 1.7 %    Auto Basophil %: 0.0 %      Comprehensive Metabolic Panel (12.24.19 @ 19:29)    Sodium, Serum: 136 mmol/L    Potassium, Serum: 4.2 mmol/L    Chloride, Serum: 102 mmol/L    Carbon Dioxide, Serum: 17 mmol/L    Anion Gap, Serum: 17 mmol/L    Blood Urea Nitrogen, Serum: 17 mg/dL    Creatinine, Serum: 1.1 mg/dL    Glucose, Serum: 108 mg/dL    Calcium, Total Serum: 9.2 mg/dL    Protein Total, Serum: 6.9 g/dL    Albumin, Serum: 4.4 g/dL    Bilirubin Total, Serum: 2.4 mg/dL    Alkaline Phosphatase, Serum: 70 U/L    Aspartate Aminotransferase (AST/SGOT): 28 U/L    Alanine Aminotransferase (ALT/SGPT): 42 U/L    eGFR if Non : 84: Interpretative comment  The units for eGFR are mL/min/1.73M2 (normalized body surface area). The  eGFR is calculated from a serum creatinine using the CKD-EPI equation.  Other variables required for calculation are race, age and sex. Among  patients with chronic kidney disease (CKD), the eGFR is useful in  determining the stage of disease according to KDOQI CKD classification.  All eGFR results are reported numerically with the following  interpretation.          < from: CT Abdomen and Pelvis w/ IV Cont (12.24.19 @ 21:47) >    IMPRESSION:     No CT evidence for acute intra-abdominal or pelvic pathology      < end of copied text >

## 2021-09-29 LAB — CYTOLOGY THIN PREP PAP: NORMAL

## 2021-10-01 LAB
ACQUISITION DURATION: NORMAL S
AVERAGE HEART RATE: 76 BPM
HOOKUP DATE: NORMAL
HOOKUP TIME: NORMAL
MAX HEART RATE TIME/DATE: NORMAL
MAX HEART RATE: 123 BPM
MIN HEART RATE TIME/DATE: NORMAL
MIN HEART RATE: 47 BPM
NUMBER OF QRS COMPLEXES: NORMAL
NUMBER OF SUPRAVENTRICULAR COUPLETS: 0
NUMBER OF SUPRAVENTRICULAR ECTOPICS: 84
NUMBER OF SUPRAVENTRICULAR ISOLATED BEATS: 20
NUMBER OF VENTRICULAR BIGEMINAL CYCLES: 0
NUMBER OF VENTRICULAR COUPLETS: 0
NUMBER OF VENTRICULAR ECTOPICS: 0

## 2021-10-04 LAB
CHLAMYDIA TRACHOMATIS AMPLIFIED DET: NEGATIVE
MEDIA TYPE: NORMAL
N GONORRHOEAE AMPLIFIED DET: NEGATIVE
SOURCE: NORMAL

## 2021-10-18 LAB — SOURCE: NORMAL

## 2021-11-23 ENCOUNTER — OFFICE VISIT (OUTPATIENT)
Dept: FAMILY MEDICINE CLINIC | Age: 36
End: 2021-11-23
Payer: MEDICARE

## 2021-11-23 VITALS
SYSTOLIC BLOOD PRESSURE: 134 MMHG | OXYGEN SATURATION: 98 % | WEIGHT: 113.6 LBS | BODY MASS INDEX: 22.19 KG/M2 | TEMPERATURE: 98.4 F | DIASTOLIC BLOOD PRESSURE: 78 MMHG | HEART RATE: 73 BPM | RESPIRATION RATE: 16 BRPM

## 2021-11-23 DIAGNOSIS — R76.8 POSITIVE ANA (ANTINUCLEAR ANTIBODY): ICD-10-CM

## 2021-11-23 DIAGNOSIS — E03.8 HYPOTHYROIDISM DUE TO HASHIMOTO'S THYROIDITIS: ICD-10-CM

## 2021-11-23 DIAGNOSIS — R31.9 HEMATURIA, UNSPECIFIED TYPE: Primary | ICD-10-CM

## 2021-11-23 DIAGNOSIS — E06.3 HYPOTHYROIDISM DUE TO HASHIMOTO'S THYROIDITIS: ICD-10-CM

## 2021-11-23 DIAGNOSIS — R52 GENERALIZED BODY ACHES: ICD-10-CM

## 2021-11-23 LAB
BILIRUBIN URINE: NEGATIVE
BLOOD URINE, POC: NEGATIVE
CHARACTER, URINE: CLEAR
COLOR, URINE: YELLOW
GLUCOSE URINE: NEGATIVE MG/DL
KETONES, URINE: NEGATIVE
LEUKOCYTE CLUMPS, URINE: NEGATIVE
NITRITE, URINE: NEGATIVE
PH, URINE: 7.5 (ref 5–9)
PROTEIN, URINE: NEGATIVE MG/DL
SPECIFIC GRAVITY, URINE: 1.02 (ref 1–1.03)
UROBILINOGEN, URINE: 1 EU/DL (ref 0–1)

## 2021-11-23 PROCEDURE — G8484 FLU IMMUNIZE NO ADMIN: HCPCS | Performed by: NURSE PRACTITIONER

## 2021-11-23 PROCEDURE — G8427 DOCREV CUR MEDS BY ELIG CLIN: HCPCS | Performed by: NURSE PRACTITIONER

## 2021-11-23 PROCEDURE — 99214 OFFICE O/P EST MOD 30 MIN: CPT | Performed by: NURSE PRACTITIONER

## 2021-11-23 PROCEDURE — 36415 COLL VENOUS BLD VENIPUNCTURE: CPT | Performed by: NURSE PRACTITIONER

## 2021-11-23 PROCEDURE — 1036F TOBACCO NON-USER: CPT | Performed by: NURSE PRACTITIONER

## 2021-11-23 PROCEDURE — 81003 URINALYSIS AUTO W/O SCOPE: CPT | Performed by: NURSE PRACTITIONER

## 2021-11-23 PROCEDURE — G8420 CALC BMI NORM PARAMETERS: HCPCS | Performed by: NURSE PRACTITIONER

## 2021-11-23 RX ORDER — PREDNISONE 1 MG/1
7.5 TABLET ORAL DAILY
Qty: 15 TABLET | Refills: 0 | Status: SHIPPED | OUTPATIENT
Start: 2021-11-23 | End: 2021-12-03

## 2021-11-23 ASSESSMENT — ENCOUNTER SYMPTOMS
COUGH: 0
SHORTNESS OF BREATH: 0

## 2021-11-23 NOTE — PROGRESS NOTES
[address] [address]  Dept: [phone]  Loc: [address] (:  1985) is a 29 y.o. female,Established patient, here for evaluation of the following chief complaint(s): Other (inflamation and rash - recently told about lupus) and Hematuria      ASSESSMENT/PLAN:  1. Hematuria, unspecified type  -     POCT Urinalysis No Micro (Auto)  2. Positive DIANN (antinuclear antibody)  -     External Referral To Rheumatology  -     predniSONE (DELTASONE) 5 MG tablet; Take 1.5 tablets by mouth daily for 10 days, Disp-15 tablet, R-0Normal  3. Generalized body aches  -     External Referral To Rheumatology  -     predniSONE (DELTASONE) 5 MG tablet; Take 1.5 tablets by mouth daily for 10 days, Disp-15 tablet, R-0Normal  4. Hypothyroidism due to Hashimoto's thyroiditis  -     TSH With Reflex Ft4  Patient nontoxic-appearing and in no acute distress. She is tearful regarding her concerns. Patient was referred to rheumatology but it looks like she cannot be seen until mid summer. New referral was placed to a [facility] rheumatologist in [facility], hoping to get her in sooner. She was supposed to get thyroid level rechecked and has not done that yet so she will do that today, she was also seen an endocrinologist in [facility] but missed her last couple appointments due to being ill. She was encouraged to call the office to reschedule  She is following up with Dr. Anna Marie Garcia this Friday to review the thyroid and make appropriate changes if needed  Regarding her urine she reports that this morning she had blood in her urine the collection today was negative for any blood. She denies any urinary frequency or pain no CVA tenderness. Patient was given low-dose prednisone to take daily for 10 days to see if this helps with her generalized body aches and inflammation.  Like stated above patient to follow with PCP on Friday    Return in about 3 days (around 11/26/2021) for f/u on Friday with Frank Solo . SUBJECTIVE/OBJECTIVE:  Needs to have thyroid level rechecked, order in    Do they want a different Rheumatologist... Morales? ?     On Suboxone. Noticed blood in her urine this am. No dysuria. Has been seen in the past in [facility] by Endo also. Was referred , will call and reschedule     Facial rash and rash to neck, has been going on for years. When she feels achy, and fatigue the rash will present     Last episode was about 2 weeks ago, she felt like she had the worst flu ever    Memory and focus is off. Period over a week.        has a past medical history of Fibromyalgia, Hashimoto's thyroiditis, Hypothyroidism, Kidney stone, Neuropathy, and Scoliosis. Review of Systems   Constitutional: Positive for fatigue. Respiratory: Negative for cough and shortness of breath. Cardiovascular: Negative for chest pain, palpitations and leg swelling. Genitourinary: Positive for hematuria. Musculoskeletal: Positive for arthralgias and myalgias. Skin: Positive for rash. Neurological: Negative for headaches. Psychiatric/Behavioral: Positive for dysphoric mood. The patient is nervous/anxious. Physical Exam  Vitals and nursing note reviewed. Constitutional:       General: She is not in acute distress. Appearance: Normal appearance. She is well-developed. She is not ill-appearing or diaphoretic. HENT:      Head: Normocephalic and atraumatic. Right Ear: Tympanic membrane and external ear normal. Tympanic membrane is not injected or erythematous. Left Ear: Tympanic membrane and external ear normal. Tympanic membrane is not injected or erythematous. Nose: Nose normal.      Mouth/Throat:      Mouth: Mucous membranes are moist.      Pharynx: Oropharynx is clear. Uvula midline. Eyes:      General:         Right eye: No discharge. Left eye: No discharge.       Conjunctiva/sclera: Conjunctivae normal.      Pupils: Pupils are equal, round, and reactive to light. Neck:      Thyroid: No thyromegaly. Trachea: Trachea normal.   Cardiovascular:      Rate and Rhythm: Normal rate and regular rhythm. Pulses: Normal pulses. Heart sounds: Normal heart sounds, S1 normal and S2 normal. No murmur heard. No friction rub. No gallop. Pulmonary:      Effort: Pulmonary effort is normal. No respiratory distress. Breath sounds: Normal breath sounds. No wheezing or rales. Chest:      Chest wall: No tenderness. Abdominal:      General: Abdomen is flat. Bowel sounds are normal. There is no distension. Palpations: Abdomen is soft. There is no mass. Tenderness: There is no abdominal tenderness. There is no right CVA tenderness, left CVA tenderness, guarding or rebound. Musculoskeletal:         General: No tenderness or deformity. Normal range of motion. Cervical back: Full passive range of motion without pain, normal range of motion and neck supple. Lymphadenopathy:      Cervical: No cervical adenopathy. Skin:     General: Skin is warm and dry. Capillary Refill: Capillary refill takes less than 2 seconds. Findings: Rash present. Rash is papular. Comments: Erythematous papular rash noted across cheeks. Neurological:      Mental Status: She is alert and oriented to person, place, and time. Deep Tendon Reflexes: Reflexes are normal and symmetric. Psychiatric:         Mood and Affect: Affect is tearful. Speech: Speech normal.         Behavior: Behavior normal.         Thought Content: Thought content normal.             An electronic signature was used to authenticate this note.     --ERASMO Ha - CNP

## 2021-11-24 LAB
T4 FREE: 1.13 NG/DL (ref 0.93–1.76)
TSH SERPL DL<=0.05 MIU/L-ACNC: 7.14 UIU/ML (ref 0.4–4.2)

## 2021-11-29 ENCOUNTER — VIRTUAL VISIT (OUTPATIENT)
Dept: FAMILY MEDICINE CLINIC | Age: 36
End: 2021-11-29
Payer: MEDICARE

## 2021-11-29 DIAGNOSIS — K06.8 BLEEDING GUMS: ICD-10-CM

## 2021-11-29 DIAGNOSIS — R76.8 POSITIVE ANA (ANTINUCLEAR ANTIBODY): ICD-10-CM

## 2021-11-29 DIAGNOSIS — R82.998 RED-COLORED URINE: ICD-10-CM

## 2021-11-29 DIAGNOSIS — B37.31 VAGINAL CANDIDA: ICD-10-CM

## 2021-11-29 DIAGNOSIS — R10.9 ABDOMINAL PAIN, UNSPECIFIED ABDOMINAL LOCATION: ICD-10-CM

## 2021-11-29 DIAGNOSIS — R10.13 EPIGASTRIC PAIN: ICD-10-CM

## 2021-11-29 DIAGNOSIS — E03.9 HYPOTHYROIDISM, UNSPECIFIED TYPE: Primary | ICD-10-CM

## 2021-11-29 PROCEDURE — G8428 CUR MEDS NOT DOCUMENT: HCPCS | Performed by: FAMILY MEDICINE

## 2021-11-29 PROCEDURE — G8484 FLU IMMUNIZE NO ADMIN: HCPCS | Performed by: FAMILY MEDICINE

## 2021-11-29 PROCEDURE — G8420 CALC BMI NORM PARAMETERS: HCPCS | Performed by: FAMILY MEDICINE

## 2021-11-29 PROCEDURE — 1036F TOBACCO NON-USER: CPT | Performed by: FAMILY MEDICINE

## 2021-11-29 PROCEDURE — 99214 OFFICE O/P EST MOD 30 MIN: CPT | Performed by: FAMILY MEDICINE

## 2021-11-29 RX ORDER — LEVOTHYROXINE SODIUM 0.07 MG/1
75 TABLET ORAL DAILY
Qty: 90 TABLET | Refills: 1 | Status: SHIPPED | OUTPATIENT
Start: 2021-11-29 | End: 2022-01-27 | Stop reason: SDUPTHER

## 2021-11-29 RX ORDER — FLUCONAZOLE 150 MG/1
150 TABLET ORAL ONCE
Qty: 3 TABLET | Refills: 0 | Status: SHIPPED | OUTPATIENT
Start: 2021-11-29 | End: 2021-11-29

## 2021-11-29 ASSESSMENT — ENCOUNTER SYMPTOMS
SHORTNESS OF BREATH: 0
CONSTIPATION: 1
ABDOMINAL PAIN: 1
COUGH: 0

## 2021-11-29 NOTE — PROGRESS NOTES
[address] [address]  Dept: [phone]  Dept Fax: [phone]  Loc: [phone]      Anthony Duran is a 29 y.o. female who presents todayfor her medical conditions/complaints as noted below. Anthony uDran is c/o of Follow-up    Anthony Duran is a 29 y.o. female being evaluated by a Virtual Visit (video visit) encounter to address concerns as mentioned below. A caregiver was present when appropriate. Due to this being a TeleHealth encounter (During [facility]- public health emergency), evaluation of the following organ systems was limited: Vitals/Constitutional/EENT/Resp/CV/GI//MS/Neuro/Skin/Heme-Lymph-Imm. Pursuant to the emergency declaration under the [address] [place] [address] authority and the [employer] Act, this Virtual Visit was conducted with patient's (and/or legal guardian's) consent, to reduce the patient's risk of exposure to COVID-19 and provide necessary medical care. The patient (and/or legal guardian) has also been advised to contact this office for worsening conditions or problems, and seek emergency medical treatment and/or call 911 if deemed necessary. Patient identification was verified at the start of the visit: Yes    Total time spent for this encounter: Not billed by time. Services were provided through a video synchronous discussion virtually to substitute for in-person clinic visit. Patient and provider were located at their individual homes/offices.      :     HPI     TSH still high. Taking one tablet on thyroid medication. Not taking 1.5 tablets. Feeling a bit better. Did have a bad rash all over. Was nauseous. Ciara Watts started her on steroids, which helped a lot. Has been having bleeding gums a lot. Tries to eat healthy. Fruit every day. Needs some teeth pulled also. Will plan to make a visit with rheumatology the 1st week of December. Blood in urine is gone. A bit of pain over right kidney. UA was negative for blood. History of red urine. Is getting belly pain also. Where you get acid reflux. Off and on for years. Also a cramping pain like she has had with yeast infections. Chronic vaginal discharge but this is normal for her. Clear stretchy. Still with intermittent shortness of breath. Symptoms are better with steroids. Also getting pain in heart. Not related to activity at all. Not related to exercise. Reports variable creatine levels. Patient Active Problem List   Diagnosis    Hypothyroidism    Chronic fatigue    Hypoglycemia     Goals    None       The patient is allergic to codeine. Medical History  Hendricks Councilman has a past medical history of Fibromyalgia, Hashimoto's thyroiditis, Hypothyroidism, Kidney stone, Neuropathy, and Scoliosis. Past SurgicalHistory  The patient  has a past surgical history that includes [place] tooth extraction. Family History  This patient's family history includes High Blood Pressure in her sister. Social History  Hendricks Councilman  reports that she has quit smoking. She has never used smokeless tobacco. She reports previous drug use. She reports that she does not drink alcohol.     Medications    Current Outpatient Medications:     levothyroxine (SYNTHROID) 75 MCG tablet, Take 1 tablet by mouth daily, Disp: 90 tablet, Rfl: 1    predniSONE (DELTASONE) 5 MG tablet, Take 1.5 tablets by mouth daily for 10 days, Disp: 15 tablet, Rfl: 0    levothyroxine (SYNTHROID) 50 MCG tablet, TAKE 1 AND 1/2 TABLETS BY MOUTH EVERY DAY, Disp: 60 tablet, Rfl: 0    ibuprofen (ADVIL;MOTRIN) 800 MG tablet, TALE 1 TABLET BY MOUTH THREE TIMES A DAY WITH FOOD, Disp: , Rfl: 0    SUBOXONE 8-2 MG FILM SL film, , Disp: , Rfl: 0    Multiple Vitamins-Minerals (MULTIVITAMIN WOMEN) TABS, Take by mouth, Disp: , Rfl:     Subjective: Review of Systems   Constitutional: Positive for fatigue. Respiratory: Negative for cough and shortness of breath. Cardiovascular: Negative for chest pain, palpitations and leg swelling. Gastrointestinal: Positive for abdominal pain and constipation. Endocrine: Positive for cold intolerance. Genitourinary: Positive for hematuria (cleared now). Musculoskeletal: Positive for arthralgias (improved on steroids; prior pain was bad enough she could barely walk). Negative for myalgias. Skin: Positive for rash. Neurological: Negative for headaches. Psychiatric/Behavioral: Positive for dysphoric mood. The patient is nervous/anxious (improved). Brain fog       Objective: There were no vitals filed for this visit. Physical Exam  Constitutional:       General: She is not in acute distress. Appearance: Normal appearance. She is not ill-appearing or toxic-appearing. HENT:      Head: Normocephalic and atraumatic. Nose: Nose normal.      Mouth/Throat:      Mouth: Mucous membranes are moist.   Eyes:      Conjunctiva/sclera: Conjunctivae normal.      Pupils: Pupils are equal, round, and reactive to light. Pulmonary:      Effort: Pulmonary effort is normal. No respiratory distress. Skin:     General: Skin is dry. Findings: No rash. Neurological:      Mental Status: She is alert. Psychiatric:         Mood and Affect: Mood normal.         Behavior: Behavior normal.         Thought Content: Thought content normal.         Lab Results   Component Value Date    WBC 6.8 09/13/2021    HGB 13.5 09/13/2021    HCT 43.5 09/13/2021     09/13/2021    HDL 92 09/13/2021    ALT 11 09/13/2021    AST 15 09/13/2021     09/13/2021    K 4.1 09/13/2021     09/13/2021    CREATININE 0.7 09/13/2021    BUN 16 09/13/2021    CO2 25 09/13/2021    TSH 7.140 (H) 11/23/2021    LABA1C 4.9 09/16/2015       Tresa Almanza:   1.  Hypothyroidism, unspecified type  Will increase from 50 to 75 mcg.    - levothyroxine (SYNTHROID) 75 MCG tablet; Take 1 tablet by mouth daily  Dispense: 90 tablet; Refill: 1  - TSH With Reflex Ft4; Future    2. Bleeding gums  Will check Vitamin C with labs. - Vitamin C; Future    3. Epigastric pain  Chronic. No acute symptoms today. Screen for h. Pylori.    - H. Pylori Antigen, Stool; Future    4. Red-colored urine  No blood on dipstick. Screen for porphyria. - Porphyrin, Total; Future  - Porphobilinogen Random Urine; Future    5. Abdominal pain, unspecified abdominal location  As per #4. Chronic intermittent. Indications for prompt return and/or emergent presentation if worsening reviewed in detail.      - Porphyrin, Total; Future  - Porphobilinogen Random Urine; Future    6. Positive DIANN (antinuclear antibody)  Attempt to expedite rheumatology referral.  Encouraged cancellation list.      7. Vaginal candida  Reviewed recent testing. Treat empirically. Return in about 4 weeks (around 12/27/2021). Orders Placed   Orders Placed This Encounter   Procedures    Vitamin C     Standing Status:   Future     Standing Expiration Date:   11/29/2022    TSH With Reflex Ft4     Standing Status:   Future     Standing Expiration Date:   11/29/2022    H.  Pylori Antigen, Stool     Standing Status:   Future     Standing Expiration Date:   11/29/2022    Porphyrin, Total     Standing Status:   Future     Standing Expiration Date:   11/30/2022    Porphobilinogen Random Urine     Standing Status:   Future     Standing Expiration Date:   11/30/2022    Comprehensive Metabolic Panel     Standing Status:   Future     Standing Expiration Date:   11/30/2022    CBC Auto Differential     Standing Status:   Future     Standing Expiration Date:   11/30/2022       Prescriptions given/sent  Orders Placed This Encounter   Medications    levothyroxine (SYNTHROID) 75 MCG tablet     Sig: Take 1 tablet by mouth daily     Dispense:  90 tablet     Refill:  1    fluconazole (DIFLUCAN) 150 MG tablet Sig: Take 1 tablet by mouth once for 1 dose Take one tablet every 3 days for 3 doses     Dispense:  3 tablet     Refill:  0       Patient given educational materials - see patient instructions. Discussed use, benefit, and side effects of recommended medications. All patient questions answered. Pt voiced understanding. Reviewed health maintenance.             Electronically signed by Chanel Ash MD on 11/30/2021 at 12:41 AM

## 2021-12-02 DIAGNOSIS — R11.2 NAUSEA AND VOMITING, INTRACTABILITY OF VOMITING NOT SPECIFIED, UNSPECIFIED VOMITING TYPE: Primary | ICD-10-CM

## 2021-12-02 RX ORDER — ONDANSETRON 4 MG/1
4 TABLET, ORALLY DISINTEGRATING ORAL 3 TIMES DAILY PRN
Qty: 21 TABLET | Refills: 0 | Status: SHIPPED | OUTPATIENT
Start: 2021-12-02 | End: 2022-03-24

## 2021-12-28 ENCOUNTER — VIRTUAL VISIT (OUTPATIENT)
Dept: FAMILY MEDICINE CLINIC | Age: 36
End: 2021-12-28
Payer: MEDICARE

## 2021-12-28 DIAGNOSIS — R09.81 SINUS CONGESTION: Primary | ICD-10-CM

## 2021-12-28 LAB
Lab: NORMAL
QC PASS/FAIL: NORMAL
SARS-COV-2 RDRP RESP QL NAA+PROBE: NEGATIVE

## 2021-12-28 PROCEDURE — 99213 OFFICE O/P EST LOW 20 MIN: CPT | Performed by: NURSE PRACTITIONER

## 2021-12-28 PROCEDURE — 87635 SARS-COV-2 COVID-19 AMP PRB: CPT | Performed by: NURSE PRACTITIONER

## 2021-12-28 PROCEDURE — G8427 DOCREV CUR MEDS BY ELIG CLIN: HCPCS | Performed by: NURSE PRACTITIONER

## 2021-12-28 RX ORDER — AMOXICILLIN AND CLAVULANATE POTASSIUM 875; 125 MG/1; MG/1
1 TABLET, FILM COATED ORAL 2 TIMES DAILY
Qty: 14 TABLET | Refills: 0 | Status: SHIPPED | OUTPATIENT
Start: 2021-12-28 | End: 2022-01-04

## 2021-12-28 ASSESSMENT — ENCOUNTER SYMPTOMS
EYE DISCHARGE: 0
SINUS PRESSURE: 1
CHOKING: 0
BACK PAIN: 0
CONSTIPATION: 0
EYE PAIN: 0
NAUSEA: 0
VOMITING: 0
ABDOMINAL PAIN: 0
VOICE CHANGE: 0
EYE ITCHING: 0
SHORTNESS OF BREATH: 0
CHEST TIGHTNESS: 0
SINUS PAIN: 1
WHEEZING: 0
COLOR CHANGE: 0
COUGH: 1
ABDOMINAL DISTENTION: 0

## 2021-12-28 NOTE — PROGRESS NOTES
swelling. Gastrointestinal: Negative for abdominal distention, abdominal pain, constipation, nausea and vomiting. Endocrine: Negative for cold intolerance and heat intolerance. Genitourinary: Negative for dysuria, hematuria, vaginal discharge and vaginal pain. Musculoskeletal: Negative for arthralgias, back pain, gait problem, neck pain and neck stiffness. Skin: Negative for color change and rash. Neurological: Negative for dizziness, syncope, speech difficulty, light-headedness, numbness and headaches. Psychiatric/Behavioral: Negative for behavioral problems, confusion, self-injury and suicidal ideas. The patient is not nervous/anxious. Objective: There were no vitals filed for this visit. Physical Exam  Vitals and nursing note reviewed. Constitutional:       General: She is not in acute distress. Appearance: Normal appearance. She is well-developed. She is not diaphoretic. HENT:      Head: Normocephalic and atraumatic. Right Ear: Tympanic membrane and external ear normal. Tympanic membrane is not injected or erythematous. Left Ear: Tympanic membrane and external ear normal. Tympanic membrane is not injected or erythematous. Nose: Congestion present. Mouth/Throat:      Pharynx: Uvula midline. Eyes:      General:         Right eye: No discharge. Left eye: No discharge. Conjunctiva/sclera: Conjunctivae normal.   Neck:      Thyroid: No thyromegaly. Trachea: Trachea normal.   Cardiovascular:      Heart sounds: S1 normal and S2 normal.   Pulmonary:      Effort: Pulmonary effort is normal.   Musculoskeletal:         General: Normal range of motion. Cervical back: Full passive range of motion without pain, normal range of motion and neck supple. Lymphadenopathy:      Cervical: No cervical adenopathy. Neurological:      Mental Status: She is alert and oriented to person, place, and time.       Deep Tendon Reflexes: Reflexes are normal and symmetric. Psychiatric:         Mood and Affect: Mood normal.         Assessment/Plan: Lillian Costa was seen today for sinusitis and chills. Diagnoses and all orders for this visit:    Sinus congestion  -     POCT COVID-19, Rapid  -     amoxicillin-clavulanate (AUGMENTIN) 875-125 MG per tablet; Take 1 tablet by mouth 2 times daily for 7 days      Pt non toxic appearing and in no acute distress. Rapid covid negative,. Exam and hx consistent with bacterial sinusitis. Patient instructed to increase fluids and rest. Use Tylenol and or Ibuprofen for fever or pain control. Good hand washing encouraged. Return in about 1 week (around 1/4/2022), or if symptoms worsen or fail to improve. Patient instructions given and reviewed. Electronicallysigned by ERASMO Pereira CNP on 12/28/2021 at 3:29 PM       Scott Maynard, was evaluated through a synchronous (real-time) audio-video encounter. The patient (or guardian if applicable) is aware that this is a billable service. Verbal consent to proceed has been obtained within the past 12 months. The visit was conducted pursuant to the emergency declaration under the [facility], [address] [place] [address] authority and the [employer] and [employer] Act. Patient identification was verified, and a caregiver was present when appropriate. The patient was located in a state where the provider was credentialed to provide care. Total time spent for this encounter: Not billed by time    --ERASMO Pereira CNP on 12/28/2021 at 3:29 PM    An electronic signature was used to authenticate this note.

## 2021-12-28 NOTE — PATIENT INSTRUCTIONS
Patient Education        Sinusitis: Care Instructions  Your Care Instructions     Sinusitis is an infection of the lining of the sinus cavities in your head. Sinusitis often follows a cold. It causes pain and pressure in your head and face. In most cases, sinusitis gets better on its own in 1 to 2 weeks. But some mild symptoms may last for several weeks. Sometimes antibiotics are needed. Follow-up care is a key part of your treatment and safety. Be sure to make and go to all appointments, and call your doctor if you are having problems. It's also a good idea to know your test results and keep a list of the medicines you take. How can you care for yourself at home? · Take an over-the-counter pain medicine, such as acetaminophen (Tylenol), ibuprofen (Advil, Motrin), or naproxen (Aleve). Read and follow all instructions on the label. · If the doctor prescribed antibiotics, take them as directed. Do not stop taking them just because you feel better. You need to take the full course of antibiotics. · Be careful when taking over-the-counter cold or flu medicines and Tylenol at the same time. Many of these medicines have acetaminophen, which is Tylenol. Read the labels to make sure that you are not taking more than the recommended dose. Too much acetaminophen (Tylenol) can be harmful. · Breathe warm, moist air from a steamy shower, a hot bath, or a sink filled with hot water. Avoid cold, dry air. Using a humidifier in your home may help. Follow the directions for cleaning the machine. · Use saline (saltwater) nasal washes. This can help keep your nasal passages open and wash out mucus and bacteria. You can buy saline nose drops at a grocery store or drugstore. Or you can make your own at home by adding 1 teaspoon of salt and 1 teaspoon of baking soda to 2 cups of distilled water. If you make your own, fill a bulb syringe with the solution, insert the tip into your nostril, and squeeze gently.  Brittany Ashing your nose.  · Put a hot, wet towel or a warm gel pack on your face 3 or 4 times a day for 5 to 10 minutes each time. · Try a decongestant nasal spray like oxymetazoline (Afrin). Do not use it for more than 3 days in a row. Using it for more than 3 days can make your congestion worse. When should you call for help? Call your doctor now or seek immediate medical care if:    · You have new or worse swelling or redness in your face or around your eyes.     · You have a new or higher fever. Watch closely for changes in your health, and be sure to contact your doctor if:    · You have new or worse facial pain.     · The mucus from your nose becomes thicker (like pus) or has new blood in it.     · You are not getting better as expected. Where can you learn more? Go to [email or web address]://[email or web address]. org and sign in to your [employer] account. Enter Y464 in the Shopetti box to learn more about \"Sinusitis: Care Instructions. \"     If you do not have an account, please click on the \"Sign Up Now\" link. Current as of: September 8, 2021               Content Version: 13.1  © 0833-1100 Healthwise, Incorporated. Care instructions adapted under license by [facility] ([facility]). If you have questions about a medical condition or this instruction, always ask your healthcare professional. [address] any warranty or liability for your use of this information.

## 2022-01-13 ENCOUNTER — TELEPHONE (OUTPATIENT)
Dept: FAMILY MEDICINE CLINIC | Age: 37
End: 2022-01-13

## 2022-01-13 ENCOUNTER — NURSE ONLY (OUTPATIENT)
Dept: FAMILY MEDICINE CLINIC | Age: 37
End: 2022-01-13

## 2022-01-13 DIAGNOSIS — Z20.822 CLOSE EXPOSURE TO COVID-19 VIRUS: Primary | ICD-10-CM

## 2022-01-13 LAB
Lab: ABNORMAL
QC PASS/FAIL: ABNORMAL
SARS-COV-2 RDRP RESP QL NAA+PROBE: POSITIVE

## 2022-01-13 PROCEDURE — 87635 SARS-COV-2 COVID-19 AMP PRB: CPT | Performed by: NURSE PRACTITIONER

## 2022-01-13 PROCEDURE — 99999 PR OFFICE/OUTPT VISIT,PROCEDURE ONLY: CPT | Performed by: NURSE PRACTITIONER

## 2022-01-13 NOTE — TELEPHONE ENCOUNTER
Spoke with patient via telephone she stopped in for COVID testing. Her COVID test was positive her symptoms started last Friday so today is day 6. She denies any shortness of breath or fever. Encouraged to use over-the-counter medications reviewed quarantine information no other needs at this time.

## 2022-01-17 ENCOUNTER — OFFICE VISIT (OUTPATIENT)
Dept: FAMILY MEDICINE CLINIC | Age: 37
End: 2022-01-17
Payer: MEDICARE

## 2022-01-17 VITALS
TEMPERATURE: 99 F | BODY MASS INDEX: 21.87 KG/M2 | SYSTOLIC BLOOD PRESSURE: 104 MMHG | OXYGEN SATURATION: 99 % | RESPIRATION RATE: 16 BRPM | WEIGHT: 112 LBS | DIASTOLIC BLOOD PRESSURE: 74 MMHG | HEART RATE: 77 BPM

## 2022-01-17 DIAGNOSIS — R10.9 ABDOMINAL PAIN, UNSPECIFIED ABDOMINAL LOCATION: ICD-10-CM

## 2022-01-17 DIAGNOSIS — R82.998 RED-COLORED URINE: ICD-10-CM

## 2022-01-17 DIAGNOSIS — U07.1 COVID-19: Primary | ICD-10-CM

## 2022-01-17 DIAGNOSIS — R76.8 POSITIVE ANA (ANTINUCLEAR ANTIBODY): ICD-10-CM

## 2022-01-17 DIAGNOSIS — H72.91 PERFORATION OF RIGHT TYMPANIC MEMBRANE: ICD-10-CM

## 2022-01-17 DIAGNOSIS — E03.9 HYPOTHYROIDISM, UNSPECIFIED TYPE: ICD-10-CM

## 2022-01-17 DIAGNOSIS — K06.8 BLEEDING GUMS: ICD-10-CM

## 2022-01-17 LAB
BASOPHILS # BLD: 1 %
BASOPHILS ABSOLUTE: 0 THOU/MM3 (ref 0–0.1)
EOSINOPHIL # BLD: 1.2 %
EOSINOPHILS ABSOLUTE: 0 THOU/MM3 (ref 0–0.4)
ERYTHROCYTE [DISTWIDTH] IN BLOOD BY AUTOMATED COUNT: 12.5 % (ref 11.5–14.5)
ERYTHROCYTE [DISTWIDTH] IN BLOOD BY AUTOMATED COUNT: 43.2 FL (ref 35–45)
HCT VFR BLD CALC: 40.8 % (ref 37–47)
HEMOGLOBIN: 13.2 GM/DL (ref 12–16)
IMMATURE GRANS (ABS): 0.01 THOU/MM3 (ref 0–0.07)
IMMATURE GRANULOCYTES: 0.2 %
LYMPHOCYTES # BLD: 24.3 %
LYMPHOCYTES ABSOLUTE: 1 THOU/MM3 (ref 1–4.8)
MCH RBC QN AUTO: 30.2 PG (ref 26–33)
MCHC RBC AUTO-ENTMCNC: 32.4 GM/DL (ref 32.2–35.5)
MCV RBC AUTO: 93.4 FL (ref 81–99)
MONOCYTES # BLD: 7.8 %
MONOCYTES ABSOLUTE: 0.3 THOU/MM3 (ref 0.4–1.3)
NUCLEATED RED BLOOD CELLS: 0 /100 WBC
PLATELET # BLD: 274 THOU/MM3 (ref 130–400)
PMV BLD AUTO: 9.4 FL (ref 9.4–12.4)
RBC # BLD: 4.37 MILL/MM3 (ref 4.2–5.4)
SEG NEUTROPHILS: 65.5 %
SEGMENTED NEUTROPHILS ABSOLUTE COUNT: 2.7 THOU/MM3 (ref 1.8–7.7)
WBC # BLD: 4.1 THOU/MM3 (ref 4.8–10.8)

## 2022-01-17 PROCEDURE — 99214 OFFICE O/P EST MOD 30 MIN: CPT | Performed by: FAMILY MEDICINE

## 2022-01-17 PROCEDURE — G8427 DOCREV CUR MEDS BY ELIG CLIN: HCPCS | Performed by: FAMILY MEDICINE

## 2022-01-17 PROCEDURE — G8420 CALC BMI NORM PARAMETERS: HCPCS | Performed by: FAMILY MEDICINE

## 2022-01-17 PROCEDURE — 1036F TOBACCO NON-USER: CPT | Performed by: FAMILY MEDICINE

## 2022-01-17 PROCEDURE — G8484 FLU IMMUNIZE NO ADMIN: HCPCS | Performed by: FAMILY MEDICINE

## 2022-01-17 PROCEDURE — 36415 COLL VENOUS BLD VENIPUNCTURE: CPT | Performed by: FAMILY MEDICINE

## 2022-01-17 RX ORDER — BENZONATATE 200 MG/1
200 CAPSULE ORAL 3 TIMES DAILY PRN
Qty: 30 CAPSULE | Refills: 0 | Status: SHIPPED | OUTPATIENT
Start: 2022-01-17 | End: 2022-01-24

## 2022-01-17 ASSESSMENT — ENCOUNTER SYMPTOMS
HEMOPTYSIS: 0
RHINORRHEA: 1
WHEEZING: 0
SHORTNESS OF BREATH: 0
SORE THROAT: 0
HEARTBURN: 1
COUGH: 1

## 2022-01-17 NOTE — PROGRESS NOTES
[address] [address]  Dept: [phone]  Dept Fax: [phone]  Loc: [phone]      Twyla Beverly is a 29 y.o. female who presents todayfor her medical conditions/complaints as noted below. Twyla Beverly is c/o of Follow-up and Cough (x1 day. tested positive for covid 1/12/22 )      :     Cough  This is a new problem. The current episode started 1 to 4 weeks ago. The problem has been waxing and waning. The cough is productive of sputum. Associated symptoms include chest pain (sharp pain with coughing or deep breath), chills, a fever, heartburn (mild ), myalgias (generally tired and achy), nasal congestion, postnasal drip, rhinorrhea and sweats. Pertinent negatives include no ear congestion, ear pain, headaches (finally went away), hemoptysis, rash, sore throat, shortness of breath, weight loss or wheezing. Her past medical history is significant for bronchitis, environmental allergies and pneumonia. There is no history of asthma, bronchiectasis, COPD or emphysema. pleurisy        Started getting sick 11 days ago. Positive test on the 12th. Got better. Then symptoms worse today. Coughing. Does have some labs ordered also. Rheumatology visit did not happen. Has appointment for July. Mild rash across face. Due for repeat TSH. Did increase dose to 75 of synthroid currently. Has been on this dose for awhile. Belly pain is chronic and stable. Did get flu recently also. Has not gotten flu shot or COVID-19 vaccine. Patient Active Problem List   Diagnosis    Hypothyroidism    Chronic fatigue    Hypoglycemia     Goals    None       The patient is allergic to codeine. Medical History  Alex Kimble has a past medical history of Fibromyalgia, Hashimoto's thyroiditis, Hypothyroidism, Kidney stone, Neuropathy, Scoliosis, and Seasonal allergies.     Past SurgicalHistory  The patient  has a past surgical history that includes [place] tooth extraction. Family History  This patient's family history includes High Blood Pressure in her sister. Social History  Valeria Olson  reports that she has quit smoking. She has never used smokeless tobacco. She reports previous drug use. She reports that she does not drink alcohol. Medications    Current Outpatient Medications:     benzonatate (TESSALON) 200 MG capsule, Take 1 capsule by mouth 3 times daily as needed for Cough, Disp: 30 capsule, Rfl: 0    levothyroxine (SYNTHROID) 75 MCG tablet, Take 1 tablet by mouth daily, Disp: 90 tablet, Rfl: 1    ibuprofen (ADVIL;MOTRIN) 800 MG tablet, TALE 1 TABLET BY MOUTH THREE TIMES A DAY WITH FOOD, Disp: , Rfl: 0    SUBOXONE 8-2 MG FILM SL film, , Disp: , Rfl: 0    Multiple Vitamins-Minerals (MULTIVITAMIN WOMEN) TABS, Take by mouth, Disp: , Rfl:     ondansetron (ZOFRAN-ODT) 4 MG disintegrating tablet, Take 1 tablet by mouth 3 times daily as needed for Nausea or Vomiting (Patient not taking: Reported on 1/17/2022), Disp: 21 tablet, Rfl: 0    Subjective:      Review of Systems   Constitutional: Positive for chills and fever. Negative for weight loss. HENT: Positive for postnasal drip and rhinorrhea. Negative for ear pain and sore throat. Respiratory: Positive for cough. Negative for hemoptysis, shortness of breath and wheezing. Cardiovascular: Positive for chest pain (sharp pain with coughing or deep breath). Gastrointestinal: Positive for heartburn (mild ). Musculoskeletal: Positive for myalgias (generally tired and achy). Skin: Negative for rash. Allergic/Immunologic: Positive for environmental allergies. Neurological: Negative for headaches (finally went away). Objective:     Vitals:    01/17/22 1217   BP: 104/74   Site: Left Upper Arm   Pulse: 77   Resp: 16   Temp: 99 °F (37.2 °C)   TempSrc: Skin   SpO2: 99%   Weight: 112 lb (50.8 kg)       Physical Exam  Vitals reviewed.    Constitutional: General: She is not in acute distress. Appearance: She is well-developed. She is not ill-appearing or toxic-appearing. HENT:      Head: Normocephalic and atraumatic. Right Ear: Ear canal and external ear normal.      Left Ear: Ear canal and external ear normal.      Ears:      Comments: Retraction pocket versus perforation on right. Retraction pocket on left. Nose: Congestion present. No rhinorrhea. Mouth/Throat:      Mouth: Mucous membranes are moist.      Pharynx: Oropharynx is clear. No oropharyngeal exudate or posterior oropharyngeal erythema. Comments: Uvula midline  Eyes:      General:         Right eye: No discharge. Left eye: No discharge. Conjunctiva/sclera: Conjunctivae normal.   Cardiovascular:      Rate and Rhythm: Normal rate and regular rhythm. Heart sounds: Normal heart sounds. Pulmonary:      Effort: Pulmonary effort is normal. No respiratory distress. Breath sounds: Normal breath sounds. No stridor. No wheezing, rhonchi or rales. Abdominal:      Palpations: Abdomen is soft. Tenderness: There is no abdominal tenderness. Musculoskeletal:      Cervical back: Neck supple. Skin:     General: Skin is warm and dry. Findings: Rash (mild redness across cheeks) present. Neurological:      Mental Status: She is alert. Comments: No obvious focal deficit. Psychiatric:         Mood and Affect: Mood normal.         Behavior: Behavior normal.         Thought Content: Thought content normal.         Judgment: Judgment normal.         Lab Results   Component Value Date    WBC 6.8 09/13/2021    HGB 13.5 09/13/2021    HCT 43.5 09/13/2021     09/13/2021    HDL 92 09/13/2021    ALT 11 09/13/2021    AST 15 09/13/2021     09/13/2021    K 4.1 09/13/2021     09/13/2021    CREATININE 0.7 09/13/2021    BUN 16 09/13/2021    CO2 25 09/13/2021    TSH 7.140 (H) 11/23/2021    LABA1C 4.9 09/16/2015       Christofer Maki:   1.  COVID-19  Encouraged supportive care with rest, hydration, honey and tessalon for cough. Vitals and exam reassuring. Tylenol and ibuprofen as needed. Return promptly if worsening or in 1-2 weeks if symptom persist. Indications for prompt return and emergent presentation if worsening reviewed in detail. Encouraged checking home pulse oximetry with call if persistently 94 or less or any reading under 90. Reviewed isolation guidelines. If patient calls with clinical worsening would consider antibiotics given duration.       - benzonatate (TESSALON) 200 MG capsule; Take 1 capsule by mouth 3 times daily as needed for Cough  Dispense: 30 capsule; Refill: 0    2. Perforation of right tympanic membrane  Chronic. Perforation versus local retraction pocket. Insufflation could help discern difference. Offered referral to ENT but declining. Will avoid water in ears. Let me know if referral is desired. 3. Hypothyroidism, unspecified type  Checking labs. 4. Red-colored urine  Checking urine for porphyria. 5. Abdominal pain, unspecified abdominal location  Stable. 6. Positive DIANN (antinuclear antibody)  Will encourage cancellation list for rheumatology to see if they can move up appointment. Return in about 4 weeks (around 2/14/2022) for Follow-up chronic conditions. Orders Placed   No orders of the defined types were placed in this encounter. Prescriptions given/sent  Orders Placed This Encounter   Medications    benzonatate (TESSALON) 200 MG capsule     Sig: Take 1 capsule by mouth 3 times daily as needed for Cough     Dispense:  30 capsule     Refill:  0       Patient given educational materials - see patient instructions. Discussed use, benefit, and side effects of recommended medications. All patient questions answered. Pt voiced understanding. Reviewed health maintenance - encouraged vaccines at pharmacy once feeling better.             Electronically signed by Laura Gomes MD on 1/17/2022 at 12:38 PM

## 2022-01-18 LAB
ALBUMIN SERPL-MCNC: 4.4 G/DL (ref 3.5–5.1)
ALP BLD-CCNC: 48 U/L (ref 38–126)
ALT SERPL-CCNC: 13 U/L (ref 11–66)
ANION GAP SERPL CALCULATED.3IONS-SCNC: 10 MEQ/L (ref 8–16)
AST SERPL-CCNC: 14 U/L (ref 5–40)
BILIRUB SERPL-MCNC: 0.4 MG/DL (ref 0.3–1.2)
BUN BLDV-MCNC: 15 MG/DL (ref 7–22)
CALCIUM SERPL-MCNC: 9.4 MG/DL (ref 8.5–10.5)
CHLORIDE BLD-SCNC: 104 MEQ/L (ref 98–111)
CO2: 24 MEQ/L (ref 23–33)
CREAT SERPL-MCNC: 0.5 MG/DL (ref 0.4–1.2)
GFR SERPL CREATININE-BSD FRML MDRD: > 90 ML/MIN/1.73M2
GLUCOSE BLD-MCNC: 88 MG/DL (ref 70–108)
POTASSIUM SERPL-SCNC: 4.5 MEQ/L (ref 3.5–5.2)
SODIUM BLD-SCNC: 138 MEQ/L (ref 135–145)
T4 FREE: 1.07 NG/DL (ref 0.93–1.76)
TOTAL PROTEIN: 6.9 G/DL (ref 6.1–8)
TSH SERPL DL<=0.05 MIU/L-ACNC: 5.88 UIU/ML (ref 0.4–4.2)

## 2022-01-21 LAB — MISC. #1 REFERENCE GROUP TEST: NORMAL

## 2022-01-22 LAB — VITAMIN C: NORMAL

## 2022-01-27 DIAGNOSIS — E03.9 HYPOTHYROIDISM, UNSPECIFIED TYPE: ICD-10-CM

## 2022-01-27 DIAGNOSIS — D72.819 LEUKOPENIA, UNSPECIFIED TYPE: Primary | ICD-10-CM

## 2022-01-27 RX ORDER — LEVOTHYROXINE SODIUM 88 UG/1
88 TABLET ORAL DAILY
Qty: 60 TABLET | Refills: 1 | Status: SHIPPED | OUTPATIENT
Start: 2022-01-27 | End: 2022-03-24 | Stop reason: SDUPTHER

## 2022-02-09 ENCOUNTER — NURSE ONLY (OUTPATIENT)
Dept: FAMILY MEDICINE CLINIC | Age: 37
End: 2022-02-09

## 2022-02-11 LAB — HELICOBACTER PYLORI ANTIGEN, STOOL: NORMAL

## 2022-02-14 LAB — MISC. #1 REFERENCE GROUP TEST: NORMAL

## 2022-03-24 ENCOUNTER — OFFICE VISIT (OUTPATIENT)
Dept: FAMILY MEDICINE CLINIC | Age: 37
End: 2022-03-24
Payer: MEDICARE

## 2022-03-24 VITALS
DIASTOLIC BLOOD PRESSURE: 70 MMHG | HEIGHT: 60 IN | OXYGEN SATURATION: 97 % | HEART RATE: 86 BPM | SYSTOLIC BLOOD PRESSURE: 116 MMHG | TEMPERATURE: 98.7 F | BODY MASS INDEX: 23.16 KG/M2 | RESPIRATION RATE: 16 BRPM | WEIGHT: 118 LBS

## 2022-03-24 DIAGNOSIS — E03.9 HYPOTHYROIDISM, UNSPECIFIED TYPE: ICD-10-CM

## 2022-03-24 DIAGNOSIS — R06.02 SHORT OF BREATH ON EXERTION: ICD-10-CM

## 2022-03-24 DIAGNOSIS — K62.5 RECTAL BLEEDING: ICD-10-CM

## 2022-03-24 DIAGNOSIS — R76.8 POSITIVE ANA (ANTINUCLEAR ANTIBODY): ICD-10-CM

## 2022-03-24 DIAGNOSIS — L29.9 PRURITUS: Primary | ICD-10-CM

## 2022-03-24 DIAGNOSIS — N89.8 VAGINAL IRRITATION: ICD-10-CM

## 2022-03-24 DIAGNOSIS — R07.9 CHEST PAIN, UNSPECIFIED TYPE: ICD-10-CM

## 2022-03-24 PROCEDURE — G8484 FLU IMMUNIZE NO ADMIN: HCPCS | Performed by: FAMILY MEDICINE

## 2022-03-24 PROCEDURE — 99214 OFFICE O/P EST MOD 30 MIN: CPT | Performed by: FAMILY MEDICINE

## 2022-03-24 PROCEDURE — G8420 CALC BMI NORM PARAMETERS: HCPCS | Performed by: FAMILY MEDICINE

## 2022-03-24 PROCEDURE — 93000 ELECTROCARDIOGRAM COMPLETE: CPT | Performed by: FAMILY MEDICINE

## 2022-03-24 PROCEDURE — 1036F TOBACCO NON-USER: CPT | Performed by: FAMILY MEDICINE

## 2022-03-24 PROCEDURE — G8427 DOCREV CUR MEDS BY ELIG CLIN: HCPCS | Performed by: FAMILY MEDICINE

## 2022-03-24 RX ORDER — LEVOTHYROXINE SODIUM 88 UG/1
88 TABLET ORAL DAILY
Qty: 90 TABLET | Refills: 1 | Status: SHIPPED | OUTPATIENT
Start: 2022-03-24 | End: 2022-04-26 | Stop reason: SDUPTHER

## 2022-03-24 RX ORDER — FLUCONAZOLE 150 MG/1
150 TABLET ORAL ONCE
Qty: 2 TABLET | Refills: 0 | Status: SHIPPED | OUTPATIENT
Start: 2022-03-24 | End: 2022-03-24

## 2022-03-24 ASSESSMENT — ENCOUNTER SYMPTOMS
SORE THROAT: 0
ABDOMINAL PAIN: 0
ROS SKIN COMMENTS: PRURITUS
CONSTIPATION: 1
NAUSEA: 0
SHORTNESS OF BREATH: 0
COUGH: 0

## 2022-03-24 NOTE — PROGRESS NOTES
[address] [address]  Dept: [phone]  Dept Fax: [phone]  Loc: [phone]      Margarito Browne is a 29 y.o. female who presents todayfor her medical conditions/complaints as noted below. Margarito Browne is c/o of Follow-up and Pruritis (scalp, palms, back of arm- tingling/ itching sensation )      :     HPI     Itching:  Happens occasionally, but sometimes now with tingling. Itching is constant, but tingling is intermittent. Started about 3-4 days ago. No rash. No history of eczema in herself of family. She uses [employer] free body wash or aveeno. She uses farmacy lotion. Will try eucerin. Concerned she may have a yeast infection. Feels internal crampy feeling, but also about to get her period. No discharge. Positive DIANN:  Appointment is in July. Having weeks where she feels very inflamed and rashy. But doing good right now. No other questions or complaints. Constipation is better with increase of thyroid dose. Will repeat CBC also. Patient Active Problem List   Diagnosis    Hypothyroidism    Chronic fatigue    Hypoglycemia     Goals    None       The patient is allergic to codeine. Medical History  [facility] has a past medical history of Fibromyalgia, Hashimoto's thyroiditis, Hypothyroidism, Kidney stone, Neuropathy, Scoliosis, and Seasonal allergies. Past SurgicalHistory  The patient  has a past surgical history that includes [place] tooth extraction. Family History  This patient's family history includes High Blood Pressure in her sister. Social History  [facility]  reports that she has quit smoking. She has never used smokeless tobacco. She reports previous drug use. She reports that she does not drink alcohol.     Medications    Current Outpatient Medications:     fluconazole (DIFLUCAN) 150 MG tablet, Take 1 tablet by mouth once for 1 dose Repeat dose in 1 week if still with symptoms. , Disp: 2 tablet, Rfl: 0    levothyroxine (SYNTHROID) 88 MCG tablet, Take 1 tablet by mouth daily, Disp: 90 tablet, Rfl: 1    ibuprofen (ADVIL;MOTRIN) 800 MG tablet, Take 800 mg by mouth every 6 hours as needed , Disp: , Rfl: 0    SUBOXONE 8-2 MG FILM SL film, , Disp: , Rfl: 0    Multiple Vitamins-Minerals (MULTIVITAMIN WOMEN) TABS, Take by mouth, Disp: , Rfl:     Subjective:      Review of Systems   Constitutional: Negative for chills and fever. HENT: Negative for congestion and sore throat. Eyes: Negative for visual disturbance. Respiratory: Negative for cough and shortness of breath. Cardiovascular: Positive for chest pain. Gastrointestinal: Positive for constipation. Negative for abdominal pain and nausea. Genitourinary: Negative for dysuria, pelvic pain, vaginal bleeding and vaginal discharge. Internal vaginal irritation   Skin: Negative for rash. Pruritus   Neurological: Negative for dizziness, light-headedness and headaches. Psychiatric/Behavioral: The patient is not nervous/anxious. Objective:     Vitals:    03/24/22 1549   BP: 116/70   Site: Left Upper Arm   Position: Sitting   Pulse: 86   Resp: 16   Temp: 98.7 °F (37.1 °C)   TempSrc: Temporal   SpO2: 97%   Weight: 118 lb (53.5 kg)   Height: 5' (1.524 m)       Physical Exam  Exam conducted with a chaperone present. Constitutional:       General: She is not in acute distress. Appearance: Normal appearance. HENT:      Head: Normocephalic. Right Ear: External ear normal.      Left Ear: External ear normal.      Nose: Nose normal.      Mouth/Throat:      Mouth: Mucous membranes are moist.   Eyes:      General: No scleral icterus. Extraocular Movements: Extraocular movements intact. Cardiovascular:      Rate and Rhythm: Normal rate and regular rhythm. Pulses: Normal pulses. Heart sounds: Normal heart sounds.    Pulmonary:      Effort: Pulmonary effort is normal.      Breath sounds: Normal breath sounds. Abdominal:      General: Abdomen is flat. There is no distension. Palpations: Abdomen is soft. Genitourinary:     Pubic Area: No rash. Labia:         Right: No rash or tenderness. Left: No rash or tenderness. Urethra: No urethral swelling. Vagina: Vaginal discharge (whitish discharge) present. No erythema or bleeding. Cervix: Normal.   Musculoskeletal:         General: Normal range of motion. Cervical back: Normal range of motion. Skin:     General: Skin is warm and dry. Findings: Erythema (from scratching) present. No rash. Neurological:      Mental Status: She is alert. Motor: No weakness. Psychiatric:         Mood and Affect: Mood normal.         Lab Results   Component Value Date    WBC 4.1 (L) 01/17/2022    HGB 13.2 01/17/2022    HCT 40.8 01/17/2022     01/17/2022    HDL 92 09/13/2021    ALT 13 01/17/2022    AST 14 01/17/2022     01/17/2022    K 4.5 01/17/2022     01/17/2022    CREATININE 0.5 01/17/2022    BUN 15 01/17/2022    CO2 24 01/17/2022    TSH 5.880 (H) 01/17/2022    LABA1C 4.9 09/16/2015       Alex Rodrigues: Sabiha Kwan was seen today for follow-up and pruritis. Diagnoses and all orders for this visit:    Pruritus  Acute  Has noticed increased diffuse itching for the last few days  Has not been using her thicker lotion/moisturizer  Encouraged Eucerin advanced repair after she showers  Avoid scented lotions and products    Vaginal irritation  Acute  History of yeast infections, concerned she may have another  Some white discharge noted on exam, otherwise normal  -     Vaginal Pathogens DNA Panel  -     fluconazole (DIFLUCAN) 150 MG tablet; Take 1 tablet by mouth once for 1 dose Repeat dose in 1 week if still with symptoms. Rectal bleeding  Acute  -     AFL - Harpal Henderson MD, Gastroenterology, [facility]    Hypothyroidism, unspecified type  Acute, controlled  -     levothyroxine (SYNTHROID) 88 MCG tablet;  Take 1 tablet by mouth daily    Need for tetanus booster  Indicated, but not covered for her insurance. Declined for insurance reasons.  -     Cancel: Tdap (age 6y and older) IM ([address])    Chest pain, unspecified type  Acute  Will get chest xray, stress test, EKG today  -     XR CHEST STANDARD (2 VW); Future  -     CARDIAC STRESS TEST EXERCISE ONLY; Future  -     EKG 12 Lead; Future  -     EKG 12 Lead    Short of breath on exertion  Acute  Will get echo and stress test  -     Echocardiogram complete; Future  -     CARDIAC STRESS TEST EXERCISE ONLY; Future    Return in about 4 weeks (around 4/21/2022).     Orders Placed   Orders Placed This Encounter   Procedures    Vaginal Pathogens DNA Panel    XR CHEST STANDARD (2 VW)     Standing Status:   Future     Standing Expiration Date:   3/24/2023     Order Specific Question:   Reason for exam:     Answer:   re-check granuloma; chest pain   Mckinley Major [facility]     Referral Priority:   Routine     Referral Type:   Eval and Treat     Referral Reason:   Specialty Services Required     Referred to Provider:   Eda Collet, MD     Requested Specialty:   Gastroenterology     Number of Visits Requested:   1    CARDIAC STRESS TEST EXERCISE ONLY     Standing Status:   Future     Standing Expiration Date:   3/24/2023     Order Specific Question:   Reason for Exam?     Answer:   Chest pain    EKG 12 Lead     Standing Status:   Future     Number of Occurrences:   1     Standing Expiration Date:   3/24/2023     Order Specific Question:   Reason for Exam?     Answer:   Chest pain    Echocardiogram complete     Standing Status:   Future     Standing Expiration Date:   5/23/2022     Order Specific Question:   Reason for exam:     Answer:   short of breath       Prescriptions given/sent  Orders Placed This Encounter   Medications    fluconazole (DIFLUCAN) 150 MG tablet     Sig: Take 1 tablet by mouth once for 1 dose Repeat dose in 1 week if still with symptoms. Dispense:  2 tablet     Refill:  0    levothyroxine (SYNTHROID) 88 MCG tablet     Sig: Take 1 tablet by mouth daily     Dispense:  90 tablet     Refill:  1       Patient given educational materials - see patient instructions. Discussed use, benefit, and side effects of prescribed medications. All patientquestions answered. Pt voiced understanding. Reviewed health maintenance.           Electronically signed by Amrit Rick DO on 3/24/2022 at 5:26 PM

## 2022-03-24 NOTE — PROGRESS NOTES
Attending attestation:  I personally performed and participated key or critical portions of the evaluation and management including personally performing the exam and medical decision making. I verify the accuracy of the documentation by the resident with the following addition or changes: Multiple concerns today. Positive DIANN and has upcoming follow-up with rheumatology. Getting constipation and has had intermittent rectal bleeding. This has happened twice in the last 3 months although constipation is better with thyroid medication adjustment. Wonders if she should get a colonoscopy. Is concerned about possible colon cancer. I agree with scope. Referral placed. Will re-check labs today re: mild leukopenia and follow-up TSH; refilled synthroid at current dose. Gets winded really fast. Getting chest pain that is worse with exercise; present today but patient reports it has been going on for sometime. Worse with exertion. Reviewed results of prior chest x-ray, EKG, and Holter monitor. Will go ahead and repeat chest x-ray and EKG and check echocardiogram and stress test.  I do not believe granuloma see on prior imaging should cause symptoms. If this is stable this would not be a cause for concern. Diflucan for yeast infection as empiric treatment; send vaginal pathogens panel. Low suspicion for sexually transmitted disease given history and declines testing.         Electronically signed by Sandra Nieto MD on 3/24/2022 at 4:35 PM

## 2022-03-26 LAB
CANDIDA SPECIES, DNA PROBE: NEGATIVE
GARDNERELLA VAGINALIS, DNA PROBE: POSITIVE
SOURCE: ABNORMAL
TRICHOMONAS VAGINALIS DNA: NEGATIVE

## 2022-03-28 DIAGNOSIS — B96.89 BACTERIAL VAGINOSIS: Primary | ICD-10-CM

## 2022-03-28 DIAGNOSIS — N76.0 BACTERIAL VAGINOSIS: Primary | ICD-10-CM

## 2022-03-28 RX ORDER — METRONIDAZOLE 500 MG/1
500 TABLET ORAL 2 TIMES DAILY
Qty: 14 TABLET | Refills: 0 | Status: SHIPPED | OUTPATIENT
Start: 2022-03-28 | End: 2022-04-04

## 2022-03-29 DIAGNOSIS — N94.6 CRAMPY PAIN ASSOCIATED WITH MENSES: Primary | ICD-10-CM

## 2022-03-29 RX ORDER — IBUPROFEN 800 MG/1
800 TABLET ORAL EVERY 8 HOURS PRN
Qty: 100 TABLET | Refills: 1 | Status: SHIPPED | OUTPATIENT
Start: 2022-03-29 | End: 2022-05-31

## 2022-03-29 NOTE — TELEPHONE ENCOUNTER
----- Message from Rafael Alexander MD sent at 3/28/2022  1:17 PM EDT -----  Please let patient know that testing confirmed bacterial vaginosis (non-sexually transmitted alteration in normal vaginal bakari) as cause of symptoms. No yeast infection. Should start antibiotic and take as prescribed. Avoid alcoholic beverages when on antibiotic.

## 2022-03-29 NOTE — TELEPHONE ENCOUNTER
Spoke with patient- voiced understanding- no questions at this time    She is asking if you can prescribe  mg- voiced that she normally gets from another dr but having a hard time getting through to them- uses for inflammation and menstrual cramps. .patient states that she is currently having really bad menstrual cramps -  [facility]

## 2022-04-11 ENCOUNTER — TELEPHONE (OUTPATIENT)
Dept: FAMILY MEDICINE CLINIC | Age: 37
End: 2022-04-11

## 2022-04-11 NOTE — TELEPHONE ENCOUNTER
Called patient- no answer- left message for patient to return call- Has she had her stress test and x-ray done? If not is it ok to schedule at [facility] - what day and time?

## 2022-04-12 NOTE — TELEPHONE ENCOUNTER
Called patient- no answer- left detailed message to return call to office to schedule test or to call central scheduling - number given

## 2022-04-13 NOTE — TELEPHONE ENCOUNTER
Patient returned phone call. Does want to get echo, stress test, and xray done. Opened Echo and stress test back up to be scheduled. Patient notified she can stop In anytime to get xray done. Transferred her to central scheduling to schedule stress test and echo.

## 2022-04-13 NOTE — TELEPHONE ENCOUNTER
Patient has been contacted numerous times by central scheduling and our office with no response. Orders canceled.

## 2022-04-25 ENCOUNTER — NURSE ONLY (OUTPATIENT)
Dept: FAMILY MEDICINE CLINIC | Age: 37
End: 2022-04-25
Payer: MEDICARE

## 2022-04-25 DIAGNOSIS — E03.9 HYPOTHYROIDISM, UNSPECIFIED TYPE: ICD-10-CM

## 2022-04-25 DIAGNOSIS — D72.819 LEUKOPENIA, UNSPECIFIED TYPE: ICD-10-CM

## 2022-04-25 PROCEDURE — 36415 COLL VENOUS BLD VENIPUNCTURE: CPT | Performed by: FAMILY MEDICINE

## 2022-04-26 DIAGNOSIS — E03.9 HYPOTHYROIDISM, UNSPECIFIED TYPE: ICD-10-CM

## 2022-04-26 LAB
BASOPHILS # BLD: 0.9 %
BASOPHILS ABSOLUTE: 0 THOU/MM3 (ref 0–0.1)
EOSINOPHIL # BLD: 2.2 %
EOSINOPHILS ABSOLUTE: 0.1 THOU/MM3 (ref 0–0.4)
ERYTHROCYTE [DISTWIDTH] IN BLOOD BY AUTOMATED COUNT: 12.6 % (ref 11.5–14.5)
ERYTHROCYTE [DISTWIDTH] IN BLOOD BY AUTOMATED COUNT: 42.8 FL (ref 35–45)
HCT VFR BLD CALC: 38.1 % (ref 37–47)
HEMOGLOBIN: 12.4 GM/DL (ref 12–16)
IMMATURE GRANS (ABS): 0.01 THOU/MM3 (ref 0–0.07)
IMMATURE GRANULOCYTES: 0.2 %
LYMPHOCYTES # BLD: 22.2 %
LYMPHOCYTES ABSOLUTE: 1.2 THOU/MM3 (ref 1–4.8)
MCH RBC QN AUTO: 30.2 PG (ref 26–33)
MCHC RBC AUTO-ENTMCNC: 32.5 GM/DL (ref 32.2–35.5)
MCV RBC AUTO: 92.7 FL (ref 81–99)
MONOCYTES # BLD: 9.2 %
MONOCYTES ABSOLUTE: 0.5 THOU/MM3 (ref 0.4–1.3)
NUCLEATED RED BLOOD CELLS: 0 /100 WBC
PLATELET # BLD: 266 THOU/MM3 (ref 130–400)
PMV BLD AUTO: 9.6 FL (ref 9.4–12.4)
RBC # BLD: 4.11 MILL/MM3 (ref 4.2–5.4)
SEG NEUTROPHILS: 65.3 %
SEGMENTED NEUTROPHILS ABSOLUTE COUNT: 3.5 THOU/MM3 (ref 1.8–7.7)
T4 FREE: 1.16 NG/DL (ref 0.93–1.76)
TSH SERPL DL<=0.05 MIU/L-ACNC: 5.59 UIU/ML (ref 0.4–4.2)
WBC # BLD: 5.4 THOU/MM3 (ref 4.8–10.8)

## 2022-04-26 RX ORDER — LEVOTHYROXINE SODIUM 0.1 MG/1
100 TABLET ORAL DAILY
Qty: 90 TABLET | Refills: 1 | Status: SHIPPED | OUTPATIENT
Start: 2022-04-26 | End: 2022-11-03

## 2022-05-10 ENCOUNTER — OFFICE VISIT (OUTPATIENT)
Dept: FAMILY MEDICINE CLINIC | Age: 37
End: 2022-05-10
Payer: MEDICARE

## 2022-05-10 ENCOUNTER — TELEPHONE (OUTPATIENT)
Dept: FAMILY MEDICINE CLINIC | Age: 37
End: 2022-05-10

## 2022-05-10 VITALS
TEMPERATURE: 98.4 F | HEIGHT: 60 IN | HEART RATE: 64 BPM | BODY MASS INDEX: 23.16 KG/M2 | OXYGEN SATURATION: 97 % | WEIGHT: 118 LBS | RESPIRATION RATE: 16 BRPM | SYSTOLIC BLOOD PRESSURE: 102 MMHG | DIASTOLIC BLOOD PRESSURE: 60 MMHG

## 2022-05-10 DIAGNOSIS — M25.541 ARTHRALGIA OF BOTH HANDS: ICD-10-CM

## 2022-05-10 DIAGNOSIS — H92.03 OTALGIA OF BOTH EARS: ICD-10-CM

## 2022-05-10 DIAGNOSIS — E03.9 HYPOTHYROIDISM, UNSPECIFIED TYPE: Primary | ICD-10-CM

## 2022-05-10 DIAGNOSIS — K62.5 RECTAL BLEEDING: ICD-10-CM

## 2022-05-10 DIAGNOSIS — R07.9 CHEST PAIN, UNSPECIFIED TYPE: ICD-10-CM

## 2022-05-10 DIAGNOSIS — M25.542 ARTHRALGIA OF BOTH HANDS: ICD-10-CM

## 2022-05-10 DIAGNOSIS — R76.8 POSITIVE ANA (ANTINUCLEAR ANTIBODY): ICD-10-CM

## 2022-05-10 PROCEDURE — G8420 CALC BMI NORM PARAMETERS: HCPCS | Performed by: FAMILY MEDICINE

## 2022-05-10 PROCEDURE — 99214 OFFICE O/P EST MOD 30 MIN: CPT | Performed by: FAMILY MEDICINE

## 2022-05-10 PROCEDURE — G8427 DOCREV CUR MEDS BY ELIG CLIN: HCPCS | Performed by: FAMILY MEDICINE

## 2022-05-10 PROCEDURE — 1036F TOBACCO NON-USER: CPT | Performed by: FAMILY MEDICINE

## 2022-05-10 ASSESSMENT — ENCOUNTER SYMPTOMS
ABDOMINAL PAIN: 0
CONSTIPATION: 1
SORE THROAT: 0
NAUSEA: 0
COUGH: 0
SHORTNESS OF BREATH: 0

## 2022-05-10 NOTE — TELEPHONE ENCOUNTER
Please re-schedule stress test and echocardiogram. V-Y Flap Text: The defect edges were debeveled with a #15 scalpel blade.  Given the location of the defect, shape of the defect and the proximity to free margins a V-Y flap was deemed most appropriate.  Using a sterile surgical marker, an appropriate advancement flap was drawn incorporating the defect and placing the expected incisions within the relaxed skin tension lines where possible.    The area thus outlined was incised deep to adipose tissue with a #15 scalpel blade.  The skin margins were undermined to an appropriate distance in all directions utilizing iris scissors.

## 2022-05-10 NOTE — TELEPHONE ENCOUNTER
Will give patient central scheduling phone number to call and reschedule her tests when leaving the office today.

## 2022-05-10 NOTE — PROGRESS NOTES
[address] [address]  Dept: [phone]  Dept Fax: [phone]  Loc: [phone]      Catalina Whaley is a 40 y.o. female who presents todayfor her medical conditions/complaints as noted below. Catalina Whaley is c/o of Otalgia (Left ear mostly- some in right x couple weeks )      :     HPI     Here for 1 month follow-up. Will need to re-schedule stress test and echocardiogram. Confused test dates. Some ear pressure. Reassuring exam.     Rheumatology moved appointment to June 22nd. Scheduled for gastroenterology scope also but will take day off work and get back on schedule. Patient will call. Just started higher dose of synthroid. Will see chiropractor for neck pain. Right hand feels vaguely weaker for a couple years. Whole hand. Worse in the mornings. Joint stiffness is main concern. Doing well on suboxone. Has tapered dose a bit. Patient Active Problem List   Diagnosis    Hypothyroidism    Chronic fatigue    Hypoglycemia     Goals    None       The patient is allergic to codeine. Medical History  Vaishnavi Hadley has a past medical history of Fibromyalgia, Hashimoto's thyroiditis, Hypothyroidism, Kidney stone, Neuropathy, Scoliosis, and Seasonal allergies. Past SurgicalHistory  The patient  has a past surgical history that includes [place] tooth extraction. Family History  This patient's family history includes High Blood Pressure in her sister. Social History  Vaishnavi Hadley  reports that she has quit smoking. She has never used smokeless tobacco. She reports previous drug use. She reports that she does not drink alcohol.     Medications    Current Outpatient Medications:     levothyroxine (SYNTHROID) 100 MCG tablet, Take 1 tablet by mouth daily, Disp: 90 tablet, Rfl: 1    ibuprofen (ADVIL;MOTRIN) 800 MG tablet, Take 1 tablet by mouth every 8 hours as needed for Pain Taking during the week prior to anticipated onset of menses may also reduce pain and bleeding during menses. , Disp: 100 tablet, Rfl: 1    SUBOXONE 8-2 MG FILM SL film, , Disp: , Rfl: 0    Multiple Vitamins-Minerals (MULTIVITAMIN WOMEN) TABS, Take by mouth, Disp: , Rfl:     Subjective:      Review of Systems   Constitutional: Negative for chills and fever. HENT: Negative for congestion and sore throat. Eyes: Negative for visual disturbance. Respiratory: Negative for cough and shortness of breath. Cardiovascular: Positive for chest pain. Gastrointestinal: Positive for constipation (much better with thyroid dose change). Negative for abdominal pain and nausea. Genitourinary: Negative for dysuria, pelvic pain, vaginal bleeding and vaginal discharge. Irritation resolve. Musculoskeletal: Positive for arthralgias, myalgias and neck pain. Skin: Negative for rash. Neurological: Positive for weakness and numbness. Negative for dizziness, light-headedness and headaches. Psychiatric/Behavioral: The patient is not nervous/anxious. Objective:     Vitals:    05/10/22 0942   BP: 102/60   Site: Left Upper Arm   Position: Sitting   Pulse: 64   Resp: 16   Temp: 98.4 °F (36.9 °C)   TempSrc: Temporal   SpO2: 97%   Weight: 118 lb (53.5 kg)   Height: 5' (1.524 m)       Physical Exam  Vitals reviewed. Constitutional:       General: She is not in acute distress. Appearance: She is well-developed. HENT:      Head: Normocephalic and atraumatic. Right Ear: Ear canal and external ear normal. No laceration, drainage, swelling or tenderness. No middle ear effusion. There is no impacted cerumen. No foreign body. No mastoid tenderness. No PE tube. No hemotympanum. Tympanic membrane is retracted. Tympanic membrane is not injected, scarred, perforated, erythematous or bulging. Left Ear: Tympanic membrane, ear canal and external ear normal.      Ears:      Comments: Retraction pocket on right.  Otherwise normal exam bilaterally. Eyes:      Conjunctiva/sclera: Conjunctivae normal.   Cardiovascular:      Rate and Rhythm: Normal rate and regular rhythm. Heart sounds: Normal heart sounds. Pulmonary:      Effort: Pulmonary effort is normal. No respiratory distress. Breath sounds: Normal breath sounds. Abdominal:      Palpations: Abdomen is soft. Tenderness: There is no abdominal tenderness. Musculoskeletal:      Cervical back: Neck supple. Skin:     General: Skin is warm and dry. Comments: No obvious rash. Neurological:      Mental Status: She is alert. Comments: No obvious focal deficit. Normal strength in hands. Symmetric. No obvious joint deformity. Psychiatric:         Behavior: Behavior normal.         Lab Results   Component Value Date    WBC 5.4 04/25/2022    HGB 12.4 04/25/2022    HCT 38.1 04/25/2022     04/25/2022    HDL 92 09/13/2021    ALT 13 01/17/2022    AST 14 01/17/2022     01/17/2022    K 4.5 01/17/2022     01/17/2022    CREATININE 0.5 01/17/2022    BUN 15 01/17/2022    CO2 24 01/17/2022    TSH 5.590 (H) 04/25/2022    LABA1C 4.9 09/16/2015       Lawernce Peel:   1. Hypothyroidism, unspecified type  Symptoms improved on increased dose. Continue 100 mcg and re-check in 4-6 weeks. Follow-up post.     2. Chest pain, unspecified type  No change. Will re-schedule stress test and echocardiogram.  Indications for prompt return and/or emergent presentation if worsening reviewed in detail. 3. Rectal bleeding  Patient will call to re-schedule colonoscopy. No large volume bleeding or anemia. Indications for prompt return and/or emergent presentation if worsening reviewed in detail. 4. Positive DIANN (antinuclear antibody)  Follow-up with rheumatology as planned. 5. Arthralgia of both hands  Rheumatology care initially with positive DIANN. More arthralgias and stiffness than weakness per say.  Consider EMG and additional testing if no rheumatologic cause identified. Indications for prompt return and/or emergent presentation if worsening reviewed in detail. 6. Otalgia of both ears  Largely normal exam today. Mild retraction pocket on right. Continue supportive care. Can use over the counter analgesic or decongestant if needed. Return in about 2 months (around 7/10/2022) for Follow-up chronic conditions. Orders Placed   No orders of the defined types were placed in this encounter. Prescriptions given/sent  No orders of the defined types were placed in this encounter. Patient given educational materials - see patient instructions. Discussed use, benefit, and side effects of recommended medications. All patient questions answered. Pt voiced understanding. Reviewed health maintenance; encouraged vaccinations at pharmacy.           Electronically signed by Jair Menchaca MD on 5/10/2022 at 10:19 AM

## 2022-05-31 DIAGNOSIS — N94.6 CRAMPY PAIN ASSOCIATED WITH MENSES: ICD-10-CM

## 2022-05-31 RX ORDER — IBUPROFEN 800 MG/1
800 TABLET ORAL EVERY 8 HOURS PRN
Qty: 100 TABLET | Refills: 1 | Status: SHIPPED | OUTPATIENT
Start: 2022-05-31 | End: 2022-08-01

## 2022-05-31 NOTE — TELEPHONE ENCOUNTER
Patient's last appointment was : 5/10/2022  Patient's next appointment is : 7/18/2022  Last refilled:  3/29/22

## 2022-06-08 ENCOUNTER — OFFICE VISIT (OUTPATIENT)
Dept: FAMILY MEDICINE CLINIC | Age: 37
End: 2022-06-08
Payer: MEDICARE

## 2022-06-08 VITALS
SYSTOLIC BLOOD PRESSURE: 106 MMHG | BODY MASS INDEX: 21.99 KG/M2 | OXYGEN SATURATION: 98 % | DIASTOLIC BLOOD PRESSURE: 76 MMHG | WEIGHT: 112.6 LBS | RESPIRATION RATE: 16 BRPM | HEART RATE: 73 BPM

## 2022-06-08 DIAGNOSIS — R10.2 VAGINAL PAIN: ICD-10-CM

## 2022-06-08 DIAGNOSIS — N89.8 VAGINAL DISCHARGE: ICD-10-CM

## 2022-06-08 DIAGNOSIS — M25.512 CHRONIC LEFT SHOULDER PAIN: Primary | ICD-10-CM

## 2022-06-08 DIAGNOSIS — M41.34 THORACOGENIC SCOLIOSIS OF THORACIC REGION: ICD-10-CM

## 2022-06-08 DIAGNOSIS — G89.29 CHRONIC LEFT SHOULDER PAIN: Primary | ICD-10-CM

## 2022-06-08 LAB
BILIRUBIN, POC: NEGATIVE
BLOOD URINE, POC: NORMAL
CLARITY, POC: CLEAR
COLOR, POC: YELLOW
GLUCOSE URINE, POC: NEGATIVE
KETONES, POC: NORMAL
LEUKOCYTE EST, POC: NEGATIVE
NITRITE, POC: NEGATIVE
PH, POC: 6.5
PROTEIN, POC: NEGATIVE
SPECIFIC GRAVITY, POC: 1.02
UROBILINOGEN, POC: 0.2

## 2022-06-08 PROCEDURE — 1036F TOBACCO NON-USER: CPT | Performed by: NURSE PRACTITIONER

## 2022-06-08 PROCEDURE — 81003 URINALYSIS AUTO W/O SCOPE: CPT | Performed by: NURSE PRACTITIONER

## 2022-06-08 PROCEDURE — G8427 DOCREV CUR MEDS BY ELIG CLIN: HCPCS | Performed by: NURSE PRACTITIONER

## 2022-06-08 PROCEDURE — 99214 OFFICE O/P EST MOD 30 MIN: CPT | Performed by: NURSE PRACTITIONER

## 2022-06-08 PROCEDURE — G8420 CALC BMI NORM PARAMETERS: HCPCS | Performed by: NURSE PRACTITIONER

## 2022-06-08 RX ORDER — METRONIDAZOLE 7.5 MG/G
1 GEL VAGINAL EVERY EVENING
Qty: 70 G | Refills: 1 | Status: SHIPPED | OUTPATIENT
Start: 2022-06-08 | End: 2022-06-13

## 2022-06-08 ASSESSMENT — ENCOUNTER SYMPTOMS
COUGH: 0
VOMITING: 0
SHORTNESS OF BREATH: 0
NAUSEA: 0
CHEST TIGHTNESS: 0
COLOR CHANGE: 0
ABDOMINAL DISTENTION: 0
CHOKING: 0
EYE PAIN: 0
VOICE CHANGE: 0
EYE DISCHARGE: 0
WHEEZING: 0
CONSTIPATION: 0
ABDOMINAL PAIN: 1
BACK PAIN: 0
SINUS PRESSURE: 0
EYE ITCHING: 0

## 2022-06-08 ASSESSMENT — PATIENT HEALTH QUESTIONNAIRE - PHQ9
2. FEELING DOWN, DEPRESSED OR HOPELESS: 0
1. LITTLE INTEREST OR PLEASURE IN DOING THINGS: 1
SUM OF ALL RESPONSES TO PHQ QUESTIONS 1-9: 1
SUM OF ALL RESPONSES TO PHQ9 QUESTIONS 1 & 2: 1

## 2022-06-08 NOTE — PROGRESS NOTES
[address] [address]  Dept: [phone]  Loc: [address] (:  1985) is a 40 y.o. female,Established patient, here for evaluation of the following chief complaint(s):  Vaginal Pain (x1.5 weeks feels the same as BV in the past ), Abdominal Cramping, Nausea, and Shoulder Pain (left )      ASSESSMENT/PLAN:  1. Chronic left shoulder pain  Chronic left shoulder pain, worsening in last few months. Exam with pain, decreased strength. XR today   Patient sees Rheumatology later this month. -     XR SHOULDER LEFT (MIN 2 VIEWS); Future  -     diclofenac sodium (VOLTAREN) 1 % GEL; Apply 2 g topically 4 times daily as needed for Pain, Topical, 4 TIMES DAILY PRN Starting 2022, Disp-150 g, R-1, Normal  2. Vaginal discharge  3 months ago treated with Flagyl, will use metrogel today   -     metroNIDAZOLE (METROGEL) 0.75 % vaginal gel; Place 1 Applicatorful vaginally every evening for 5 days, Vaginal, EVERY EVENING Starting Wed 2022, Until Mon 2022, For 5 days, Disp-70 g, R-1, Normal  3. Thoracogenic scoliosis of thoracic region      Return in about 1 week (around 6/15/2022), or if symptoms worsen or fail to improve. SUBJECTIVE/OBJECTIVE:  Patient presents with:  Vaginal Pain: x1.5 weeks feels the same as BV in the past   Abdominal Cramping  Nausea  Shoulder Pain: left     No odor to discharge     Left shoulder pain     Hx of scoliosis     Should pain in joint space, feels deep    alternates ice and heat     N/t in left arm, weakness in left hand       has a past medical history of Fibromyalgia, Hashimoto's thyroiditis, Hypothyroidism, Kidney stone, Neuropathy, Scoliosis, and Seasonal allergies. Review of Systems   Constitutional: Negative for appetite change, chills, fatigue and fever. HENT: Negative for congestion, ear discharge, sinus pressure, tinnitus and voice change.     Eyes: Negative for pain, discharge, itching and visual disturbance. Respiratory: Negative for cough, choking, chest tightness, shortness of breath and wheezing. Cardiovascular: Negative for chest pain, palpitations and leg swelling. Gastrointestinal: Positive for abdominal pain. Negative for abdominal distention, constipation, nausea and vomiting. Endocrine: Negative for cold intolerance and heat intolerance. Genitourinary: Positive for vaginal discharge and vaginal pain. Negative for dysuria and hematuria. Musculoskeletal: Negative for arthralgias, back pain, gait problem, neck pain and neck stiffness. Left shoulder pain      Skin: Negative for color change and rash. Neurological: Negative for dizziness, syncope, speech difficulty, light-headedness, numbness and headaches. Psychiatric/Behavioral: Negative for behavioral problems, confusion, self-injury and suicidal ideas. The patient is not nervous/anxious. Physical Exam  Vitals and nursing note reviewed. Constitutional:       General: She is not in acute distress. Appearance: Normal appearance. She is well-developed. She is not diaphoretic. HENT:      Head: Normocephalic and atraumatic. Right Ear: Tympanic membrane and external ear normal. Tympanic membrane is not injected or erythematous. Left Ear: Tympanic membrane and external ear normal. Tympanic membrane is not injected or erythematous. Nose: Nose normal.      Mouth/Throat:      Pharynx: Oropharynx is clear. Uvula midline. Eyes:      General:         Right eye: No discharge. Left eye: No discharge. Conjunctiva/sclera: Conjunctivae normal.      Pupils: Pupils are equal, round, and reactive to light. Neck:      Thyroid: No thyromegaly. Trachea: Trachea normal.   Cardiovascular:      Rate and Rhythm: Normal rate and regular rhythm. Pulses: Normal pulses. Heart sounds: Normal heart sounds, S1 normal and S2 normal. No murmur heard.   No friction rub. No gallop. Pulmonary:      Effort: Pulmonary effort is normal. No respiratory distress. Breath sounds: Normal breath sounds. No wheezing or rales. Chest:      Chest wall: No tenderness. Abdominal:      General: Bowel sounds are normal. There is no distension. Palpations: Abdomen is soft. There is no mass. Tenderness: There is no abdominal tenderness. There is no guarding or rebound. Musculoskeletal:         General: No deformity. Normal range of motion. Right shoulder: Normal.      Left shoulder: Tenderness present. Normal range of motion. Decreased strength. Cervical back: Full passive range of motion without pain, normal range of motion and neck supple. No rigidity or tenderness. Thoracic back: Scoliosis present. Comments: Left shoulder pain with exam, no swelling noted. Left arm is weaker than right. Lymphadenopathy:      Cervical: No cervical adenopathy. Skin:     General: Skin is warm and dry. Capillary Refill: Capillary refill takes less than 2 seconds. Neurological:      Mental Status: She is alert and oriented to person, place, and time. Deep Tendon Reflexes: Reflexes are normal and symmetric. Psychiatric:         Mood and Affect: Mood normal.         Behavior: Behavior normal.             An electronic signature was used to authenticate this note.     --ERASMO Stephenson - CNP

## 2022-06-08 NOTE — PATIENT INSTRUCTIONS
Health. If you have questions about a medical condition or this instruction, always ask your healthcare professional. [place] [address] any warranty or liability for your use of this information. Patient Education        Shoulder Sprain: Care Instructions  Your Care Instructions     A shoulder sprain occurs when you stretch or tear a ligament in your shoulder. Ligaments are tough tissues that connect one bone to another. A sprain canhappen during sports, a fall, or projects around the house. Shoulder sprains usually get better with treatment at home. Follow-up care is a key part of your treatment and safety. Be sure to make and go to all appointments, and call your doctor if you are having problems. It's also a good idea to know your test results and keep alist of the medicines you take. How can you care for yourself at home?  Rest and protect your shoulder. Try to stop or reduce any action that causes pain.  If your doctor gave you a sling or immobilizer, wear it as directed. A sling or immobilizer supports your shoulder and may make you more comfortable.  Put ice or a cold pack on your shoulder for 10 to 20 minutes at a time. Try to do this every 1 to 2 hours for the next 3 days (when you are awake) or until the swelling goes down. Put a thin cloth between the ice and your skin. Some doctors suggest alternating between hot and cold.  Be safe with medicines. Read and follow all instructions on the label. ? If the doctor gave you a prescription medicine for pain, take it as prescribed. ? If you are not taking a prescription pain medicine, ask your doctor if you can take an over-the-counter medicine.  For the first day or two after an injury, avoid things that might increase swelling, such as hot showers, hot tubs, or hot packs.  After 2 or 3 days, if your swelling is gone, apply a heating pad set on low or a warm cloth to your shoulder.  This helps keep your shoulder

## 2022-06-10 ENCOUNTER — TELEPHONE (OUTPATIENT)
Dept: FAMILY MEDICINE CLINIC | Age: 37
End: 2022-06-10

## 2022-06-10 NOTE — TELEPHONE ENCOUNTER
----- Message from ERASMO López - CNP sent at 6/10/2022  8:32 AM EDT -----  Please notify Melodie Martinez that her shoulder xray did not show any fracture or dislocation. At this time we can see what Dr. Arsalan York thinks or I can refer her to Orthopedics.

## 2022-06-13 ENCOUNTER — HOSPITAL ENCOUNTER (OUTPATIENT)
Dept: NON INVASIVE DIAGNOSTICS | Age: 37
Discharge: HOME | End: 2022-06-13
Payer: MEDICARE

## 2022-06-13 DIAGNOSIS — R07.9 CHEST PAIN, UNSPECIFIED TYPE: ICD-10-CM

## 2022-06-13 DIAGNOSIS — R06.02 SHORT OF BREATH ON EXERTION: ICD-10-CM

## 2022-06-13 PROCEDURE — 93017 CV STRESS TEST TRACING ONLY: CPT | Performed by: NUCLEAR MEDICINE

## 2022-06-17 ENCOUNTER — TELEPHONE (OUTPATIENT)
Dept: FAMILY MEDICINE CLINIC | Age: 37
End: 2022-06-17

## 2022-06-17 ENCOUNTER — OFFICE VISIT (OUTPATIENT)
Dept: FAMILY MEDICINE CLINIC | Age: 37
End: 2022-06-17
Payer: MEDICARE

## 2022-06-17 VITALS
SYSTOLIC BLOOD PRESSURE: 122 MMHG | DIASTOLIC BLOOD PRESSURE: 80 MMHG | RESPIRATION RATE: 16 BRPM | OXYGEN SATURATION: 97 % | HEART RATE: 78 BPM

## 2022-06-17 DIAGNOSIS — G89.29 CHRONIC LEFT SHOULDER PAIN: Primary | ICD-10-CM

## 2022-06-17 DIAGNOSIS — M25.512 CHRONIC LEFT SHOULDER PAIN: Primary | ICD-10-CM

## 2022-06-17 PROCEDURE — G8420 CALC BMI NORM PARAMETERS: HCPCS | Performed by: NURSE PRACTITIONER

## 2022-06-17 PROCEDURE — 1036F TOBACCO NON-USER: CPT | Performed by: NURSE PRACTITIONER

## 2022-06-17 PROCEDURE — 99214 OFFICE O/P EST MOD 30 MIN: CPT | Performed by: NURSE PRACTITIONER

## 2022-06-17 PROCEDURE — G8427 DOCREV CUR MEDS BY ELIG CLIN: HCPCS | Performed by: NURSE PRACTITIONER

## 2022-06-17 RX ORDER — PREDNISONE 10 MG/1
TABLET ORAL
Qty: 21 TABLET | Refills: 0 | Status: SHIPPED | OUTPATIENT
Start: 2022-06-17 | End: 2022-10-17

## 2022-06-17 RX ORDER — ACETAMINOPHEN 325 MG/1
650 TABLET ORAL EVERY 6 HOURS PRN
COMMUNITY

## 2022-06-17 NOTE — TELEPHONE ENCOUNTER
----- Message from Kiley Castle MD sent at 6/16/2022 10:38 AM EDT -----  Called and left message to call back. Please schedule echocardiogram. No ischemic changes that suggested heart issues on your  test, but you also did not reach heart rate goal of 184 and only reached heart rate of 132 and stopped test after getting chest pain. Let's plan on having you see rheumatology as planned first.  If no clear cause of symptoms identified and ongoing consider chemical stress test.  Should also get echocardiogram to make sure heart is structurally normal.  You can reach me at [phone] with questions or concerns. Follow-up as planned or sooner if needed.

## 2022-06-17 NOTE — PATIENT INSTRUCTIONS
Patient Education        Shoulder Pain: Care Instructions  Your Care Instructions     You can hurt your shoulder by using it too much during an activity, such as fishing or baseball. It can also happen as part of the everyday wear and tear of getting older. Shoulder injuries can be slow to heal, but your shouldershould get better with time. Your doctor may recommend a sling to rest your shoulder. If you have injuredyour shoulder, you may need testing and treatment. Follow-up care is a key part of your treatment and safety. Be sure to make and go to all appointments, and call your doctor if you are having problems. It's also a good idea to know your test results and keep alist of the medicines you take. How can you care for yourself at home?  Take pain medicines exactly as directed. ? If the doctor gave you a prescription medicine for pain, take it as prescribed. ? If you are not taking a prescription pain medicine, ask your doctor if you can take an over-the-counter medicine. ? Do not take two or more pain medicines at the same time unless the doctor told you to. Many pain medicines contain acetaminophen, which is Tylenol. Too much acetaminophen (Tylenol) can be harmful.  If your doctor recommends that you wear a sling, use it as directed. Do not take it off before your doctor tells you to.  Put ice or a cold pack on the sore area for 10 to 20 minutes at a time. Put a thin cloth between the ice and your skin.  If there is no swelling, you can put moist heat, a heating pad, or a warm cloth on your shoulder. Some doctors suggest alternating between hot and cold.  Rest your shoulder for a few days. If your doctor recommends it, you can then begin gentle exercise of the shoulder, but do not lift anything heavy. When should you call for help? Call 911 anytime you think you may need emergency care. For example, call if:     You have chest pain or pressure.  This may occur with:  ? Sweating. ? Shortness of breath. ? Nausea or vomiting. ? Pain that spreads from the chest to the neck, jaw, or one or both shoulders or arms. ? Dizziness or lightheadedness. ? A fast or uneven pulse. After calling 911, chew 1 adult-strength aspirin. Wait for an ambulance. Do not try to drive yourself.      Your arm or hand is cool or pale or changes color. Call your doctor now or seek immediate medical care if:     You have signs of infection, such as:  ? Increased pain, swelling, warmth, or redness in your shoulder. ? Red streaks leading from a place on your shoulder. ? Pus draining from an area of your shoulder. ? Swollen lymph nodes in your neck, armpits, or groin. ? A fever. Watch closely for changes in your health, and be sure to contact your doctor if:     You cannot use your shoulder.      Your shoulder does not get better as expected. Where can you learn more? Go to [email or web address]. org and sign in to your [facility] account. Enter Q520 in the [employer] box to learn more about \"Shoulder Pain: Care Instructions. \"     If you do not have an account, please click on the \"Sign Up Now\" link. Current as of: July 1, 2021               Content Version: 13.2  © 8017-0379 Healthwise, Incorporated. Care instructions adapted under license by [facility] ([facility]). If you have questions about a medical condition or this instruction, always ask your healthcare professional. [place] [address] any warranty or liability for your use of this information.

## 2022-06-17 NOTE — TELEPHONE ENCOUNTER
Patient returned call. Relayed message and she voiced understanding. She will contact central scheduling and schedule echo soon. Thank you.

## 2022-06-17 NOTE — TELEPHONE ENCOUNTER
Called patient- no answer- left detailed message to return call and to call CS to schedule echo- CS # given

## 2022-06-17 NOTE — PROGRESS NOTES
[address] [address]  Dept: [phone]  Dept Fax: [phone]  Loc: [phone]      Lico Olivia is a 40 y.o. female who presents todayfor Shoulder Pain (Left shoulder - worse x 2 days- not sleeping well- pain radiating into jaw / face )      HPI:      Left shoulder pain, has been worse in the last 2 days. Pain is going into left face and teeth. Cannot take gabapentin, makes her feel off and she is on Suboxone. The patient is allergic to codeine. Past MedicalHistory  Stefany Koo  has a past medical history of Fibromyalgia, Hashimoto's thyroiditis, Hypothyroidism, Kidney stone, Neuropathy, Scoliosis, and Seasonal allergies. Medications    Current Outpatient Medications:     acetaminophen (TYLENOL) 325 MG tablet, Take 650 mg by mouth every 6 hours as needed for Pain, Disp: , Rfl:     predniSONE (DELTASONE) 10 MG tablet, 6 tabs day 1, 5 tabs day 2, 4 tabs day 3, 3 tabs day 4, 2 tabs day 5, 1 tab day 6, Disp: 21 tablet, Rfl: 0    diclofenac sodium (VOLTAREN) 1 % GEL, Apply 2 g topically 4 times daily as needed for Pain, Disp: 150 g, Rfl: 1    ibuprofen (ADVIL;MOTRIN) 800 MG tablet, TAKE 1 TABLET BY MOUTH EVERY 8 HOURS AS NEEDED FOR PAIN. TAKING DURING THE WEEK PRIOR TO ANTICIPATED ONSET OF MENSES MAY ALSO REDUCE PAIN AND BLEEDING DURING MENSES., Disp: 100 tablet, Rfl: 1    levothyroxine (SYNTHROID) 100 MCG tablet, Take 1 tablet by mouth daily, Disp: 90 tablet, Rfl: 1    SUBOXONE 8-2 MG FILM SL film, , Disp: , Rfl: 0    Multiple Vitamins-Minerals (MULTIVITAMIN WOMEN) TABS, Take by mouth, Disp: , Rfl:     Subjective:      Review of Systems   Constitutional: Negative for appetite change, chills, fatigue and fever. HENT: Negative for congestion, ear discharge, sinus pressure, tinnitus and voice change. Eyes: Negative for pain, discharge, itching and visual disturbance.    Respiratory: normal. No murmur heard. No friction rub. No gallop. Pulmonary:      Effort: Pulmonary effort is normal. No respiratory distress. Breath sounds: Normal breath sounds. No wheezing or rales. Chest:      Chest wall: No tenderness. Abdominal:      General: Bowel sounds are normal. There is no distension. Palpations: Abdomen is soft. There is no mass. Tenderness: There is no abdominal tenderness. There is no guarding or rebound. Musculoskeletal:         General: Tenderness present. No swelling, deformity or signs of injury. Left shoulder: Tenderness present. Decreased range of motion. Decreased strength. Arms:       Cervical back: Full passive range of motion without pain, normal range of motion and neck supple. Right lower leg: No edema. Left lower leg: No edema. Lymphadenopathy:      Cervical: No cervical adenopathy. Skin:     General: Skin is warm and dry. Capillary Refill: Capillary refill takes less than 2 seconds. Neurological:      Mental Status: She is alert and oriented to person, place, and time. Deep Tendon Reflexes: Reflexes are normal and symmetric. Assessment/Plan: Mikel Lazo was seen today for shoulder pain. Diagnoses and all orders for this visit:    Chronic left shoulder pain  -     predniSONE (DELTASONE) 10 MG tablet; 6 tabs day 1, 5 tabs day 2, 4 tabs day 3, 3 tabs day 4, 2 tabs day 5, 1 tab day 6  -     CHANELL - Mollie Kussmaul, MD, Orthopedic Surgery, [facility].[place]      Patient with chronic left shoulder pain, appears to be nerve related. No known injury. Patient unable to tolerate gabapentin, and is on suboxone. recommending ibuprofen/tylenol, Rx for Prednisone provided and referred to Orthopedics. Return in about 1 week (around 6/24/2022), or if symptoms worsen or fail to improve. Patient instructions given and reviewed.     Electronicallysigned by ERASMO Tolbert CNP on 6/22/2022 at 3:41 PM

## 2022-06-17 NOTE — PROGRESS NOTES
Patient called complaining of intense shoulder pain. Recently seen and evaluated by Raisa Stevenson. Reports no relief with diclofenac gel max dose ibuprofen and Tylenol. Cannot take opiates since onto the Suboxone. Has not tolerated Toradol in the past. Also using Lidoderm patches. Also reports dental pain, neck pain, and facial swelling. Discussed options. Probably needs to be seen and evaluated again given neck pain, dental pain and facial swelling. If infectious cause is suspected will need antibiotics. Could trial a brief round of steroids for shoulder pain if no infectious etiology suspected. Did not think she could make it into the office by 11pm. Recommended presentation to her urgent care for presentation or could attempt a video visit although evaluation may be limited. Patient will round back to let me know what she decides. Fortunately does have appointment with rheumatology upcoming as well. I am suspecting autoimmune cause of symptoms.

## 2022-06-22 ENCOUNTER — OFFICE VISIT (OUTPATIENT)
Dept: RHEUMATOLOGY | Age: 37
End: 2022-06-22
Payer: MEDICARE

## 2022-06-22 VITALS
WEIGHT: 110.8 LBS | BODY MASS INDEX: 21.75 KG/M2 | SYSTOLIC BLOOD PRESSURE: 130 MMHG | HEIGHT: 60 IN | HEART RATE: 86 BPM | OXYGEN SATURATION: 99 % | DIASTOLIC BLOOD PRESSURE: 78 MMHG

## 2022-06-22 DIAGNOSIS — R76.8 POSITIVE ANA (ANTINUCLEAR ANTIBODY): Primary | ICD-10-CM

## 2022-06-22 DIAGNOSIS — M25.50 MULTIPLE JOINT PAIN: ICD-10-CM

## 2022-06-22 DIAGNOSIS — R21 SKIN RASH: ICD-10-CM

## 2022-06-22 PROCEDURE — 1036F TOBACCO NON-USER: CPT | Performed by: INTERNAL MEDICINE

## 2022-06-22 PROCEDURE — 99204 OFFICE O/P NEW MOD 45 MIN: CPT | Performed by: INTERNAL MEDICINE

## 2022-06-22 PROCEDURE — G8420 CALC BMI NORM PARAMETERS: HCPCS | Performed by: INTERNAL MEDICINE

## 2022-06-22 PROCEDURE — G8427 DOCREV CUR MEDS BY ELIG CLIN: HCPCS | Performed by: INTERNAL MEDICINE

## 2022-06-22 ASSESSMENT — ENCOUNTER SYMPTOMS
EYE DISCHARGE: 0
CHOKING: 0
WHEEZING: 0
SORE THROAT: 0
DIARRHEA: 0
COUGH: 0
VOICE CHANGE: 0
SINUS PRESSURE: 0
WHEEZING: 0
VOMITING: 0
VOMITING: 0
SHORTNESS OF BREATH: 0
EYE ITCHING: 0
BLOOD IN STOOL: 1
CHEST TIGHTNESS: 1
COLOR CHANGE: 0
RHINORRHEA: 0
EYE PAIN: 0
ABDOMINAL DISTENTION: 0
COUGH: 0
EYE REDNESS: 0
EYE DISCHARGE: 0
CONSTIPATION: 1
SHORTNESS OF BREATH: 0
ABDOMINAL PAIN: 0
CONSTIPATION: 0
NAUSEA: 1
BACK PAIN: 0
NAUSEA: 0
CHEST TIGHTNESS: 0
ABDOMINAL PAIN: 0
EYE PAIN: 0

## 2022-06-22 NOTE — PROGRESS NOTES
[facility]) Physicians Rheumatology  Rheumatology Consult Note      6/22/2022       Reason for Consult:  Positive liam  Requesting Physician:  Александр Moser MD     CHIEF COMPLAINT:    Chief Complaint   Patient presents with    New Patient     new pt, ref by elias, positive LIAM, arthralgia, unspecified joint         History Obtained From:  patient    HISTORY OF PRESENT ILLNESS:             The patient is a 40 y.o. female. Presents today for further consultation with a rheumatologist for further evaluation  and assessment of possible lupus. Presents for evaluation of positive LIAM. This tested was prompted by leonardo breaux rash on her face and arms. Reports having sores in her scalp. Overall, not feeling well. Fatigue. Joint pain. Also has h/o Hashimoto's. Reports having these symptoms for years that has been progrssively worsening. Reports having photosensitivity. Episodes of skin rash is associated with overall fatigue, weakness and not feeling well. Frequent episode of sharp pain in her chest wall. Associated with episodes of SOB with the pain. Has frequent oral ulcers. Skin rash on face, periorbital, buttock region, arms and legs. Recently, had an episode of severe left shoulder pain that lasted for several days and improved with a course of prednisone. Frequent episodes of pain and swelling in her hands and feet. Has hair thinning with episodes of patches of alopecia. Has memory problems. Possible family h/o SLE. 3 cousins have lupus (maternal side). Past Medical History:     has a past medical history of Fibromyalgia, Hashimoto's thyroiditis, Hypothyroidism, Kidney stone, Neuropathy, Scoliosis, and Seasonal allergies. Past Surgical History:     has a past surgical history that includes [place] tooth extraction.   Current Medications:      Current Outpatient Medications:     acetaminophen (TYLENOL) 325 MG tablet, Take 650 mg by mouth every 6 hours as needed for Pain, Disp: , Rfl:     predniSONE (DELTASONE) 10 MG tablet, 6 tabs day 1, 5 tabs day 2, 4 tabs day 3, 3 tabs day 4, 2 tabs day 5, 1 tab day 6, Disp: 21 tablet, Rfl: 0    diclofenac sodium (VOLTAREN) 1 % GEL, Apply 2 g topically 4 times daily as needed for Pain, Disp: 150 g, Rfl: 1    ibuprofen (ADVIL;MOTRIN) 800 MG tablet, TAKE 1 TABLET BY MOUTH EVERY 8 HOURS AS NEEDED FOR PAIN. TAKING DURING THE WEEK PRIOR TO ANTICIPATED ONSET OF MENSES MAY ALSO REDUCE PAIN AND BLEEDING DURING MENSES., Disp: 100 tablet, Rfl: 1    levothyroxine (SYNTHROID) 100 MCG tablet, Take 1 tablet by mouth daily, Disp: 90 tablet, Rfl: 1    SUBOXONE 8-2 MG FILM SL film, , Disp: , Rfl: 0    Multiple Vitamins-Minerals (MULTIVITAMIN WOMEN) TABS, Take by mouth, Disp: , Rfl:   Allergies:    Codeine  Social History:     reports that she quit smoking about 4 years ago. She has a 1.50 pack-year smoking history. She has never used smokeless tobacco. She reports previous drug use. She reports that she does not drink alcohol. Family History:   family history includes High Blood Pressure in her sister. REVIEW OF SYSTEMS:    Review of Systems   Constitutional: Positive for appetite change and fatigue. Negative for fever and unexpected weight change. HENT: Positive for mouth sores. Negative for congestion, postnasal drip, rhinorrhea and sore throat. Eyes: Negative for pain, discharge and redness. Respiratory: Positive for chest tightness. Negative for cough, shortness of breath and wheezing. Cardiovascular: Positive for chest pain (had recent treadmill stress test, negative). Negative for leg swelling. Gastrointestinal: Positive for blood in stool (is being referred to GI for evaluation), constipation and nausea (chronic nausea). Negative for abdominal pain, diarrhea and vomiting. Genitourinary: Negative for difficulty urinating, dysuria and hematuria. Skin: Positive for rash. Neurological: Positive for headaches.  Negative for dizziness, light-headedness and numbness. Hematological: Negative for adenopathy. Does not bruise/bleed easily. PHYSICAL EXAM:    Vitals:    /78 (Site: Left Upper Arm, Position: Sitting, Cuff Size: Large Adult)   Pulse 86   Ht 5' (1.524 m)   Wt 110 lb 12.8 oz (50.3 kg)   LMP 05/26/2022 (Approximate)   SpO2 99%   BMI 21.64 kg/m²     Physical Exam  Constitutional:       General: She is not in acute distress. Appearance: Normal appearance. HENT:      Head: Normocephalic. Nose: Nose normal.      Mouth/Throat:      Mouth: Mucous membranes are dry. Pharynx: Oropharynx is clear. Comments: Poor dentition. No oral ulcers seen today. Eyes:      Extraocular Movements: Extraocular movements intact. Conjunctiva/sclera: Conjunctivae normal.      Pupils: Pupils are equal, round, and reactive to light. Cardiovascular:      Rate and Rhythm: Normal rate and regular rhythm. Heart sounds: Normal heart sounds. No murmur heard. No friction rub. Pulmonary:      Effort: Pulmonary effort is normal. No respiratory distress. Breath sounds: Normal breath sounds. No wheezing, rhonchi or rales. Abdominal:      General: Abdomen is flat. Palpations: Abdomen is soft. Musculoskeletal:         General: No swelling, tenderness or deformity. Normal range of motion. Cervical back: Normal range of motion and neck supple. Right lower leg: No edema. Left lower leg: No edema. Lymphadenopathy:      Cervical: No cervical adenopathy. Skin:     General: Skin is warm and dry. Findings: No erythema, lesion or rash. Comments: No rashes seen today. Pt shows me pics on her cell phone with erythematous diffuse rash in periorbital region, lips, buttocks, and upper chest wall. Neurological:      General: No focal deficit present. Mental Status: She is alert and oriented to person, place, and time. Mental status is at baseline.       Cranial Nerves: No cranial nerve deficit. Motor: No weakness. Gait: Gait normal.   Psychiatric:         Mood and Affect: Mood normal.            DATA:    Labs were reviewed. Imaging was reviewed. IMPRESSION/RECOMMENDATIONS:      1. Positive DIANN in the context of having joint pain, skin rash, oral ulcers, mild leukopenia. Pt also has h/o Hashimotos thyroiditis and follows with her PCP. Possible underlying diagnosis of SLE or other connective tissue disorder.   -- labs today: lupus profile, acute phase reactants  -- RTC in 2 weeks          Kandace Dudley MD    [address] [address]

## 2022-06-27 ENCOUNTER — NURSE ONLY (OUTPATIENT)
Dept: LAB | Age: 37
End: 2022-06-27

## 2022-06-27 DIAGNOSIS — M25.50 MULTIPLE JOINT PAIN: ICD-10-CM

## 2022-06-27 DIAGNOSIS — R76.8 POSITIVE ANA (ANTINUCLEAR ANTIBODY): ICD-10-CM

## 2022-06-27 DIAGNOSIS — E03.9 HYPOTHYROIDISM, UNSPECIFIED TYPE: ICD-10-CM

## 2022-06-27 DIAGNOSIS — R21 SKIN RASH: ICD-10-CM

## 2022-06-27 LAB
ALBUMIN SERPL-MCNC: 4.7 G/DL (ref 3.5–5.1)
ALP BLD-CCNC: 48 U/L (ref 38–126)
ALT SERPL-CCNC: 9 U/L (ref 11–66)
ANION GAP SERPL CALCULATED.3IONS-SCNC: 10 MEQ/L (ref 8–16)
AST SERPL-CCNC: 10 U/L (ref 5–40)
BASOPHILS # BLD: 0.5 %
BASOPHILS ABSOLUTE: 0 THOU/MM3 (ref 0–0.1)
BILIRUB SERPL-MCNC: 0.4 MG/DL (ref 0.3–1.2)
BUN BLDV-MCNC: 12 MG/DL (ref 7–22)
C-REACTIVE PROTEIN: < 0.3 MG/DL (ref 0–1)
CALCIUM SERPL-MCNC: 10 MG/DL (ref 8.5–10.5)
CHLORIDE BLD-SCNC: 100 MEQ/L (ref 98–111)
CO2: 30 MEQ/L (ref 23–33)
CREAT SERPL-MCNC: 0.8 MG/DL (ref 0.4–1.2)
EOSINOPHIL # BLD: 0.7 %
EOSINOPHILS ABSOLUTE: 0.1 THOU/MM3 (ref 0–0.4)
ERYTHROCYTE [DISTWIDTH] IN BLOOD BY AUTOMATED COUNT: 12.8 % (ref 11.5–14.5)
ERYTHROCYTE [DISTWIDTH] IN BLOOD BY AUTOMATED COUNT: 45.4 FL (ref 35–45)
GFR SERPL CREATININE-BSD FRML MDRD: 81 ML/MIN/1.73M2
GLUCOSE BLD-MCNC: 92 MG/DL (ref 70–108)
HCT VFR BLD CALC: 43.4 % (ref 37–47)
HEMOGLOBIN: 13.6 GM/DL (ref 12–16)
IMMATURE GRANS (ABS): 0.01 THOU/MM3 (ref 0–0.07)
IMMATURE GRANULOCYTES: 0.1 %
LYMPHOCYTES # BLD: 13.2 %
LYMPHOCYTES ABSOLUTE: 1.1 THOU/MM3 (ref 1–4.8)
MCH RBC QN AUTO: 30 PG (ref 26–33)
MCHC RBC AUTO-ENTMCNC: 31.3 GM/DL (ref 32.2–35.5)
MCV RBC AUTO: 95.8 FL (ref 81–99)
MONOCYTES # BLD: 6.5 %
MONOCYTES ABSOLUTE: 0.5 THOU/MM3 (ref 0.4–1.3)
NUCLEATED RED BLOOD CELLS: 0 /100 WBC
PLATELET # BLD: 282 THOU/MM3 (ref 130–400)
PMV BLD AUTO: 9 FL (ref 9.4–12.4)
POTASSIUM SERPL-SCNC: 3.7 MEQ/L (ref 3.5–5.2)
RBC # BLD: 4.53 MILL/MM3 (ref 4.2–5.4)
RHEUMATOID FACTOR: < 10 IU/ML (ref 0–13)
SEDIMENTATION RATE, ERYTHROCYTE: 1 MM/HR (ref 0–20)
SEG NEUTROPHILS: 79 %
SEGMENTED NEUTROPHILS ABSOLUTE COUNT: 6.3 THOU/MM3 (ref 1.8–7.7)
SODIUM BLD-SCNC: 140 MEQ/L (ref 135–145)
TOTAL PROTEIN: 6.9 G/DL (ref 6.1–8)
TSH SERPL DL<=0.05 MIU/L-ACNC: 3.86 UIU/ML (ref 0.4–4.2)
WBC # BLD: 8 THOU/MM3 (ref 4.8–10.8)

## 2022-06-28 LAB
C3 COMPLEMENT: 93 MG/DL (ref 90–180)
COMPLEMENT C4: 14 MG/DL (ref 10–40)

## 2022-06-30 LAB
ANA SCREEN: DETECTED
ANTI SSA: NORMAL
ANTI SSB: 0 AU/ML (ref 0–40)
ANTI-SMITH: 5 AU/ML (ref 0–40)
CYCLIC CITRULLINATED PEPTIDE ANTIBODY IGG: 3.2 U/ML (ref 0–7)

## 2022-07-01 LAB
ANTI RNP AB: 6 UNITS (ref 0–19)
DSDNA ANTIBODY: NORMAL

## 2022-07-02 LAB
ANA PATTERN: ABNORMAL
ANA TITER: ABNORMAL
ANTINUCLEAR ANTIBODY, HEP-2, IGG: DETECTED

## 2022-07-06 ENCOUNTER — OFFICE VISIT (OUTPATIENT)
Dept: RHEUMATOLOGY | Age: 37
End: 2022-07-06
Payer: MEDICARE

## 2022-07-06 VITALS
HEIGHT: 60 IN | SYSTOLIC BLOOD PRESSURE: 116 MMHG | WEIGHT: 113.4 LBS | DIASTOLIC BLOOD PRESSURE: 72 MMHG | OXYGEN SATURATION: 96 % | HEART RATE: 68 BPM | BODY MASS INDEX: 22.26 KG/M2

## 2022-07-06 DIAGNOSIS — M35.9 UNDIFFERENTIATED CONNECTIVE TISSUE DISEASE (HCC): Primary | ICD-10-CM

## 2022-07-06 PROCEDURE — 1036F TOBACCO NON-USER: CPT | Performed by: INTERNAL MEDICINE

## 2022-07-06 PROCEDURE — G8427 DOCREV CUR MEDS BY ELIG CLIN: HCPCS | Performed by: INTERNAL MEDICINE

## 2022-07-06 PROCEDURE — 99214 OFFICE O/P EST MOD 30 MIN: CPT | Performed by: INTERNAL MEDICINE

## 2022-07-06 PROCEDURE — G8420 CALC BMI NORM PARAMETERS: HCPCS | Performed by: INTERNAL MEDICINE

## 2022-07-06 RX ORDER — HYDROXYCHLOROQUINE SULFATE 200 MG/1
200 TABLET, FILM COATED ORAL DAILY
Qty: 30 TABLET | Refills: 1 | Status: SHIPPED | OUTPATIENT
Start: 2022-07-06 | End: 2022-08-31

## 2022-07-06 RX ORDER — AMOXICILLIN 875 MG/1
875 TABLET, COATED ORAL 2 TIMES DAILY
COMMUNITY
End: 2022-10-17

## 2022-07-06 ASSESSMENT — ENCOUNTER SYMPTOMS
CONSTIPATION: 1
BLOOD IN STOOL: 1
COUGH: 0
DIARRHEA: 0
VOMITING: 0
CHEST TIGHTNESS: 1
ABDOMINAL PAIN: 1
NAUSEA: 1
SHORTNESS OF BREATH: 0

## 2022-07-06 NOTE — PROGRESS NOTES
[facility]) Physicians Rheumatology  Rheumatology Consult Note      7/6/2022       CHIEF COMPLAINT:    Chief Complaint   Patient presents with    Follow-up     2 week- Positive DIANN ( antinuclear antibody)         History Obtained From:  patient    HISTORY OF PRESENT ILLNESS:    40 y.o. female presents for follow up. When seen 2 weeks ago, labs were ordered for evaluation of positive DIANN. Since seen last, reports having severe abdominal pain. Went to ED and was prescribed antibiotics. Reports having imaging that suggested colitis. She continues to have skin rash. Reports having sores in her scalp. Overall, not feeling well. Fatigue. Joint pain. Also has h/o Hashimoto's. Reports having these symptoms for years that has been progrssively worsening. Reports having photosensitivity. Episodes of skin rash is associated with overall fatigue, weakness and not feeling well. Frequent episode of sharp pain in her chest wall. Associated with episodes of SOB with the pain. Has frequent oral ulcers. Skin rash on face, periorbital, buttock region, arms and legs. Has hair thinning with episodes of patches of alopecia. Has memory problems. Past Medical History:     has a past medical history of Fibromyalgia, Hashimoto's thyroiditis, Hypothyroidism, Kidney stone, Neuropathy, Scoliosis, and Seasonal allergies. Past Surgical History:     has a past surgical history that includes [place] tooth extraction.   Current Medications:      Current Outpatient Medications:     amoxicillin (AMOXIL) 875 MG tablet, Take 875 mg by mouth 2 times daily, Disp: , Rfl:     acetaminophen (TYLENOL) 325 MG tablet, Take 650 mg by mouth every 6 hours as needed for Pain, Disp: , Rfl:     predniSONE (DELTASONE) 10 MG tablet, 6 tabs day 1, 5 tabs day 2, 4 tabs day 3, 3 tabs day 4, 2 tabs day 5, 1 tab day 6, Disp: 21 tablet, Rfl: 0    diclofenac sodium (VOLTAREN) 1 % GEL, Apply 2 g topically 4 times daily as needed for Pain, Disp: 150 g, Rfl: 1    ibuprofen (ADVIL;MOTRIN) 800 MG tablet, TAKE 1 TABLET BY MOUTH EVERY 8 HOURS AS NEEDED FOR PAIN. TAKING DURING THE WEEK PRIOR TO ANTICIPATED ONSET OF MENSES MAY ALSO REDUCE PAIN AND BLEEDING DURING MENSES., Disp: 100 tablet, Rfl: 1    levothyroxine (SYNTHROID) 100 MCG tablet, Take 1 tablet by mouth daily, Disp: 90 tablet, Rfl: 1    SUBOXONE 8-2 MG FILM SL film, , Disp: , Rfl: 0    Multiple Vitamins-Minerals (MULTIVITAMIN WOMEN) TABS, Take by mouth, Disp: , Rfl:   Allergies:    Codeine  Social History:     reports that she quit smoking about 4 years ago. She has a 1.50 pack-year smoking history. She has never used smokeless tobacco. She reports previous drug use. She reports that she does not drink alcohol. Family History:   family history includes High Blood Pressure in her sister. REVIEW OF SYSTEMS:    Review of Systems   Constitutional: Positive for fatigue. Negative for fever and unexpected weight change. HENT: Positive for mouth sores. Negative for congestion. Respiratory: Positive for chest tightness. Negative for cough and shortness of breath. Cardiovascular: Negative for chest pain (had recent treadmill stress test, negative). Gastrointestinal: Positive for abdominal pain, blood in stool (is being referred to GI for evaluation), constipation and nausea (chronic nausea). Negative for diarrhea and vomiting. Skin: Positive for rash. Neurological: Positive for headaches. Negative for light-headedness. PHYSICAL EXAM:    Vitals:    /72 (Site: Left Upper Arm, Position: Sitting, Cuff Size: Medium Adult)   Pulse 68   Ht 5' (1.524 m)   Wt 113 lb 6.4 oz (51.4 kg)   SpO2 96%   BMI 22.15 kg/m²     Physical Exam  Constitutional:       General: She is not in acute distress. Appearance: Normal appearance. HENT:      Mouth/Throat:      Comments: Poor dentition. No oral ulcers seen today.   Eyes:      Conjunctiva/sclera: Conjunctivae normal. Pulmonary:      Effort: Pulmonary effort is normal.   Abdominal:      General: Abdomen is flat. Palpations: Abdomen is soft. Musculoskeletal:         General: No swelling, tenderness or deformity. Normal range of motion. Cervical back: Normal range of motion and neck supple. Skin:     General: Skin is dry. Findings: No erythema, lesion or rash. Comments: No rashes seen today. Pt shows me pics on her cell phone with erythematous diffuse rash in periorbital region, lips, buttocks, and upper chest wall. Neurological:      Mental Status: She is alert. Psychiatric:         Mood and Affect: Mood normal.            DATA:    Labs were reviewed. Results for Vita Delarosa" (MRN [id number]) as of 7/6/2022 16:45   Ref. Range 6/27/2022 14:54 7/2/2022 15:59   DIANN PATTERN Unknown  Homogeneous (A)   DIANN TITER Unknown  1:1280 (A)   Rheumatoid Factor Latest Ref Range: 0 - 13 IU/mL < 10    Results for Vita Delarosa" (MRN [id number]) as of 7/6/2022 16:45   Ref. Range 6/27/2022 14:46 6/27/2022 14:47 6/27/2022 14:53   C3 Complement Latest Ref Range: 90 - 180 mg/dL 93     COMPLEMENT, C4,C4 Latest Ref Range: 10 - 40 mg/dL   14   Results for Vita Delarosa" (MRN [id number]) as of 7/6/2022 16:45   Ref. Range 6/27/2022 14:46   DIANN SCREEN Latest Ref Range: None Detected  Detected (A)   Anti RNP Latest Ref Range: 0 - 19 Units 6   Anti SSA Unknown negative   Anti SSB Latest Ref Range: 0 - 40 AU/mL 0   Anti-Smith Latest Ref Range: 0 - 40 AU/mL 5   dsDNA Ab Unknown negative   Antinuclear Antibody, Hep-2, IGG Latest Ref Range: <1:80  Detected (H)   Results for Vita Delarosa" (MRN [id number]) as of 7/6/2022 16:45   Ref.  Range 6/27/2022 14:54   WBC Latest Ref Range: 4.8 - 10.8 thou/mm3 8.0   RBC Latest Ref Range: 4.20 - 5.40 mill/mm3 4.53   Hemoglobin Quant Latest Ref Range: 12.0 - 16.0 gm/dl 13.6   Hematocrit Latest Ref Range: 37.0 - 47.0 % 43.4   MCV Latest Ref Range: 81.0 - 99.0 fL 95.8   MCH Latest Ref Range: 26.0 - 33.0 pg 30.0   MCHC Latest Ref Range: 32.2 - 35.5 gm/dl 31.3 (L)   MPV Latest Ref Range: 9.4 - 12.4 fL 9.0 (L)   RDW-CV Latest Ref Range: 11.5 - 14.5 % 12.8   RDW-SD Latest Ref Range: 35.0 - 45.0 fL 45.4 (H)   Platelet Count Latest Ref Range: 130 - 400 thou/mm3 282   Results for Brenda Shah" (MRN [id number]) as of 7/6/2022 16:45   Ref. Range 6/27/2022 14:54   Sodium Latest Ref Range: 135 - 145 meq/L 140   Potassium Latest Ref Range: 3.5 - 5.2 meq/L 3.7   Chloride Latest Ref Range: 98 - 111 meq/L 100   CO2 Latest Ref Range: 23 - 33 meq/L 30   BUN,BUNPL Latest Ref Range: 7 - 22 mg/dL 12   Creatinine Latest Ref Range: 0.4 - 1.2 mg/dL 0.8   Anion Gap Latest Ref Range: 8.0 - 16.0 meq/L 10.0   Est, Glom Filt Rate Latest Units: ml/min/1.73m2 81 (A)   GLUCOSE, FASTING,GF Latest Ref Range: 70 - 108 mg/dL 92   CALCIUM, SERUM, [id number] Latest Ref Range: 8.5 - 10.5 mg/dL 10.0   Total Protein Latest Ref Range: 6.1 - 8.0 g/dL 6.9           IMPRESSION/RECOMMENDATIONS:      1. Undifferentiated connective tissue disease. Patient has positive DIANN (titer 1:1280), joint pain, skin rash, oral ulcers, mild leukopenia. Pt also has h/o Hashimotos thyroiditis and follows with her PCP. -- Discussed with pt the likely diagnosis, natural history of this condition and outlined treatment plan. Patient expressed understanding.  -- trial of hydroxychloroquine 200 mg daily. Discussed with pt the benefits, risks and adverse effects of this medication. Patient expressed understanding.   -- RTC in 6 weeks        Charmayne Bump, MD    [address]  [phone]

## 2022-07-08 ENCOUNTER — TELEPHONE (OUTPATIENT)
Dept: FAMILY MEDICINE CLINIC | Age: 37
End: 2022-07-08

## 2022-07-08 NOTE — TELEPHONE ENCOUNTER
----- Message from Umesh Jones MD sent at 7/7/2022  5:39 PM EDT -----  Please call patient and have her confirm plan. Reasonable to continue current dose and re-check in 6 months or bump dose up slightly and re-check in 6 weeks. If dose increase is desired, I can send in rx. Thanks. Fine to have her call my cell to discuss if preferred.

## 2022-07-11 NOTE — TELEPHONE ENCOUNTER
Spoke with patient- she states that her rheum dr placed her on plaquenil and was wandering if this might help with her tiredness ?  If so then she will stick to current dose- if it will not help then she would like to dosage change please

## 2022-07-12 NOTE — TELEPHONE ENCOUNTER
Aware. Continue current dose for now. Follow-up as planned and may adjust at upcoming visit if symptoms not improved.

## 2022-07-31 DIAGNOSIS — N94.6 CRAMPY PAIN ASSOCIATED WITH MENSES: ICD-10-CM

## 2022-08-01 RX ORDER — IBUPROFEN 800 MG/1
TABLET ORAL
Qty: 90 TABLET | Refills: 2 | Status: SHIPPED | OUTPATIENT
Start: 2022-08-01 | End: 2022-11-03

## 2022-08-31 DIAGNOSIS — M35.9 UNDIFFERENTIATED CONNECTIVE TISSUE DISEASE (HCC): ICD-10-CM

## 2022-08-31 RX ORDER — HYDROXYCHLOROQUINE SULFATE 200 MG/1
TABLET, FILM COATED ORAL
Qty: 30 TABLET | Refills: 0 | Status: SHIPPED | OUTPATIENT
Start: 2022-08-31 | End: 2022-09-13 | Stop reason: SDUPTHER

## 2022-09-13 ENCOUNTER — OFFICE VISIT (OUTPATIENT)
Dept: RHEUMATOLOGY | Age: 37
End: 2022-09-13
Payer: MEDICARE

## 2022-09-13 VITALS
HEIGHT: 60 IN | WEIGHT: 116.2 LBS | SYSTOLIC BLOOD PRESSURE: 118 MMHG | BODY MASS INDEX: 22.81 KG/M2 | DIASTOLIC BLOOD PRESSURE: 80 MMHG | OXYGEN SATURATION: 98 % | HEART RATE: 73 BPM

## 2022-09-13 DIAGNOSIS — R21 SKIN RASH: ICD-10-CM

## 2022-09-13 DIAGNOSIS — R20.2 PARESTHESIA OF LEFT UPPER EXTREMITY: ICD-10-CM

## 2022-09-13 DIAGNOSIS — M35.9 UNDIFFERENTIATED CONNECTIVE TISSUE DISEASE (HCC): Primary | ICD-10-CM

## 2022-09-13 PROCEDURE — 99214 OFFICE O/P EST MOD 30 MIN: CPT | Performed by: INTERNAL MEDICINE

## 2022-09-13 PROCEDURE — G8428 CUR MEDS NOT DOCUMENT: HCPCS | Performed by: INTERNAL MEDICINE

## 2022-09-13 PROCEDURE — G8420 CALC BMI NORM PARAMETERS: HCPCS | Performed by: INTERNAL MEDICINE

## 2022-09-13 PROCEDURE — 1036F TOBACCO NON-USER: CPT | Performed by: INTERNAL MEDICINE

## 2022-09-13 RX ORDER — HYDROXYCHLOROQUINE SULFATE 200 MG/1
TABLET, FILM COATED ORAL
Qty: 30 TABLET | Refills: 5 | Status: SHIPPED | OUTPATIENT
Start: 2022-09-13

## 2022-09-13 ASSESSMENT — ENCOUNTER SYMPTOMS
CONSTIPATION: 1
SHORTNESS OF BREATH: 0
VOMITING: 0
DIARRHEA: 0
ABDOMINAL PAIN: 1
CHEST TIGHTNESS: 0
NAUSEA: 1
COUGH: 0
BLOOD IN STOOL: 1

## 2022-09-13 NOTE — PROGRESS NOTES
Multiple Vitamins-Minerals (MULTIVITAMIN WOMEN) TABS, Take by mouth, Disp: , Rfl:     amoxicillin (AMOXIL) 875 MG tablet, Take 875 mg by mouth 2 times daily (Patient not taking: Reported on 9/13/2022), Disp: , Rfl:   Allergies:    Codeine  Social History:     reports that she quit smoking about 4 years ago. She has a 1.50 pack-year smoking history. She has never used smokeless tobacco. She reports that she does not currently use drugs. She reports that she does not drink alcohol. Family History:   family history includes High Blood Pressure in her sister. REVIEW OF SYSTEMS:    Review of Systems   Constitutional:  Negative for chills, fatigue and fever. HENT:  Negative for congestion and mouth sores. Respiratory:  Negative for cough, chest tightness and shortness of breath. Cardiovascular:  Negative for chest pain. Gastrointestinal:  Positive for abdominal pain, blood in stool (is being referred to GI for evaluation), constipation and nausea (chronic nausea). Negative for diarrhea and vomiting. Skin:  Negative for rash. Neurological:  Positive for headaches. Negative for light-headedness. PHYSICAL EXAM:    Vitals:    /80 (Site: Left Upper Arm, Position: Sitting, Cuff Size: Medium Adult)   Pulse 73   Ht 5' (1.524 m)   Wt 116 lb 3.2 oz (52.7 kg)   SpO2 98%   BMI 22.69 kg/m²     Physical Exam  Constitutional:       General: She is not in acute distress. Appearance: Normal appearance. HENT:      Mouth/Throat:      Comments: Poor dentition. No oral ulcers seen today. Eyes:      Conjunctiva/sclera: Conjunctivae normal.   Pulmonary:      Effort: Pulmonary effort is normal.   Musculoskeletal:         General: No swelling, tenderness or deformity. Normal range of motion. Cervical back: Neck supple. Comments: Tenderness along the left trap muscle superiiorly and along the scapula. Noted winging of the left scapula. Has scoliosis. Skin:     General: Skin is dry. Findings: No erythema, lesion or rash. Comments: No rashes seen today. Pt shows me pics on her cell phone with erythematous diffuse rash in periorbital region, lips, buttocks, and upper chest wall. Neurological:      Mental Status: She is alert. Psychiatric:         Mood and Affect: Mood normal.          DATA:    Labs were reviewed. Results for Antoni Thorne" (MRN [id number]) as of 7/6/2022 16:45   Ref. Range 6/27/2022 14:54 7/2/2022 15:59   DIANN PATTERN Unknown  Homogeneous (A)   DIANN TITER Unknown  1:1280 (A)   Rheumatoid Factor Latest Ref Range: 0 - 13 IU/mL < 10    Results for Antoni Thorne" (MRN [id number]) as of 7/6/2022 16:45   Ref. Range 6/27/2022 14:46 6/27/2022 14:47 6/27/2022 14:53   C3 Complement Latest Ref Range: 90 - 180 mg/dL 93     COMPLEMENT, C4,C4 Latest Ref Range: 10 - 40 mg/dL   14   Results for Antoni Thorne" (MRN [id number]) as of 7/6/2022 16:45   Ref. Range 6/27/2022 14:46   DIANN SCREEN Latest Ref Range: None Detected  Detected (A)   Anti RNP Latest Ref Range: 0 - 19 Units 6   Anti SSA Unknown negative   Anti SSB Latest Ref Range: 0 - 40 AU/mL 0   Anti-Smith Latest Ref Range: 0 - 40 AU/mL 5   dsDNA Ab Unknown negative   Antinuclear Antibody, Hep-2, IGG Latest Ref Range: <1:80  Detected (H)   Results for Antoni Thorne" (MRN [id number]) as of 7/6/2022 16:45   Ref.  Range 6/27/2022 14:54   WBC Latest Ref Range: 4.8 - 10.8 thou/mm3 8.0   RBC Latest Ref Range: 4.20 - 5.40 mill/mm3 4.53   Hemoglobin Quant Latest Ref Range: 12.0 - 16.0 gm/dl 13.6   Hematocrit Latest Ref Range: 37.0 - 47.0 % 43.4   MCV Latest Ref Range: 81.0 - 99.0 fL 95.8   MCH Latest Ref Range: 26.0 - 33.0 pg 30.0   MCHC Latest Ref Range: 32.2 - 35.5 gm/dl 31.3 (L)   MPV Latest Ref Range: 9.4 - 12.4 fL 9.0 (L)   RDW-CV Latest Ref Range: 11.5 - 14.5 % 12.8   RDW-SD Latest Ref Range: 35.0 - 45.0 fL 45.4 (H)   Platelet Count Latest Ref Range: 130 - 400 thou/mm3 282   Results for ROSANNA HALL Eveline Rainey (MRN [id number]) as of 7/6/2022 16:45   Ref. Range 6/27/2022 14:54   Sodium Latest Ref Range: 135 - 145 meq/L 140   Potassium Latest Ref Range: 3.5 - 5.2 meq/L 3.7   Chloride Latest Ref Range: 98 - 111 meq/L 100   CO2 Latest Ref Range: 23 - 33 meq/L 30   BUN,BUNPL Latest Ref Range: 7 - 22 mg/dL 12   Creatinine Latest Ref Range: 0.4 - 1.2 mg/dL 0.8   Anion Gap Latest Ref Range: 8.0 - 16.0 meq/L 10.0   Est, Glom Filt Rate Latest Units: ml/min/1.73m2 81 (A)   GLUCOSE, FASTING,GF Latest Ref Range: 70 - 108 mg/dL 92   CALCIUM, SERUM, [id number] Latest Ref Range: 8.5 - 10.5 mg/dL 10.0   Total Protein Latest Ref Range: 6.1 - 8.0 g/dL 6.9           IMPRESSION/RECOMMENDATIONS:      1. Undifferentiated connective tissue disease. Patient has positive DIANN (titer 1:1280), joint pain, skin rash, oral ulcers, mild leukopenia. Pt also has h/o Hashimotos thyroiditis and follows with her PCP. -- clinically improved after starting hydroxychloroquine. -- continue hydroxychloroquine 200 mg daily. 2. Left shoulder/trap muscle pain. Suspect this is related to scoliosis. -- consider physical therapy    3. Paresthesia in left upper extremity.  ?radiculopathy  -- EMG/NCS ordered    RTC in 6 weeks      Zina Medrano MD    [address]   [address] [facility] [employer] [facility] [address]

## 2022-09-27 ENCOUNTER — PROCEDURE VISIT (OUTPATIENT)
Dept: NEUROLOGY | Age: 37
End: 2022-09-27
Payer: MEDICARE

## 2022-09-27 DIAGNOSIS — M25.512 CHRONIC LEFT SHOULDER PAIN: ICD-10-CM

## 2022-09-27 DIAGNOSIS — M79.602 LEFT ARM PAIN: Primary | ICD-10-CM

## 2022-09-27 DIAGNOSIS — G89.29 CHRONIC LEFT SHOULDER PAIN: ICD-10-CM

## 2022-09-27 PROCEDURE — 95886 MUSC TEST DONE W/N TEST COMP: CPT | Performed by: PSYCHIATRY & NEUROLOGY

## 2022-09-27 PROCEDURE — 95909 NRV CNDJ TST 5-6 STUDIES: CPT | Performed by: PSYCHIATRY & NEUROLOGY

## 2022-10-17 ENCOUNTER — OFFICE VISIT (OUTPATIENT)
Dept: FAMILY MEDICINE CLINIC | Age: 37
End: 2022-10-17
Payer: MEDICARE

## 2022-10-17 VITALS
HEART RATE: 68 BPM | RESPIRATION RATE: 16 BRPM | OXYGEN SATURATION: 98 % | DIASTOLIC BLOOD PRESSURE: 72 MMHG | SYSTOLIC BLOOD PRESSURE: 110 MMHG

## 2022-10-17 DIAGNOSIS — K04.7 DENTAL INFECTION: Primary | ICD-10-CM

## 2022-10-17 DIAGNOSIS — R11.0 NAUSEA: ICD-10-CM

## 2022-10-17 PROCEDURE — G8427 DOCREV CUR MEDS BY ELIG CLIN: HCPCS | Performed by: STUDENT IN AN ORGANIZED HEALTH CARE EDUCATION/TRAINING PROGRAM

## 2022-10-17 PROCEDURE — G8420 CALC BMI NORM PARAMETERS: HCPCS | Performed by: STUDENT IN AN ORGANIZED HEALTH CARE EDUCATION/TRAINING PROGRAM

## 2022-10-17 PROCEDURE — 99213 OFFICE O/P EST LOW 20 MIN: CPT | Performed by: STUDENT IN AN ORGANIZED HEALTH CARE EDUCATION/TRAINING PROGRAM

## 2022-10-17 PROCEDURE — 1036F TOBACCO NON-USER: CPT | Performed by: STUDENT IN AN ORGANIZED HEALTH CARE EDUCATION/TRAINING PROGRAM

## 2022-10-17 PROCEDURE — G8484 FLU IMMUNIZE NO ADMIN: HCPCS | Performed by: STUDENT IN AN ORGANIZED HEALTH CARE EDUCATION/TRAINING PROGRAM

## 2022-10-17 RX ORDER — AMOXICILLIN AND CLAVULANATE POTASSIUM 875; 125 MG/1; MG/1
1 TABLET, FILM COATED ORAL 2 TIMES DAILY
Qty: 14 TABLET | Refills: 0 | Status: SHIPPED | OUTPATIENT
Start: 2022-10-17 | End: 2022-10-24

## 2022-10-17 RX ORDER — ONDANSETRON 4 MG/1
4 TABLET, ORALLY DISINTEGRATING ORAL EVERY 8 HOURS PRN
Qty: 21 TABLET | Refills: 0 | Status: SHIPPED | OUTPATIENT
Start: 2022-10-17

## 2022-10-17 ASSESSMENT — ENCOUNTER SYMPTOMS
SHORTNESS OF BREATH: 0
COUGH: 0
VOMITING: 0
NAUSEA: 1

## 2022-10-17 NOTE — PROGRESS NOTES
[address] [address]  Dept: [phone]  Dept Fax: [phone]  Loc: [phone]    Drew Díaz is a 40 y.o. female who presents today for her medical conditions/complaints as noted below. Chief Complaint   Patient presents with    Dental Pain     All back teeth - can not get into dentist until April        HPI:     Patient presents to the office today with concerns of dental pain. She states that she has a long autoimmune history and for dentition. States that she has 2 broken teeth along each bottom side of the back of her mouth. She has had increased mouth pain, gum inflammation, and some bleeding over the last 1 week. States that she feels that her gum is swollen as well, worse on the right side at night. She does have pain along left and right bottom back problems. States that she cannot get into a dentist until April of next year-is on the cancellation list for [facility].  She denies any fever, chills, vomiting, but has had some nausea. Patient is worried about a tooth infection.       Past Medical History:   Diagnosis Date    Fibromyalgia     Hashimoto's thyroiditis     Hypothyroidism     Kidney stone     Neuropathy     Scoliosis     Seasonal allergies       Past Surgical History:   Procedure Laterality Date    WIS[place] TOOTH EXTRACTION         Family History   Problem Relation Age of Onset    High Blood Pressure Sister        Social History     Tobacco Use    Smoking status: Former     Packs/day: 0.50     Years: 3.00     Pack years: 1.50     Types: Cigarettes     Quit date:      Years since quittin.7    Smokeless tobacco: Never   Substance Use Topics    Alcohol use: No     Alcohol/week: 0.0 standard drinks      Current Outpatient Medications   Medication Sig Dispense Refill    amoxicillin-clavulanate (AUGMENTIN) 875-125 MG per tablet Take 1 tablet by mouth 2 times daily for 7 days 14 tablet 0    ondansetron (ZOFRAN-ODT) 4 MG disintegrating tablet Take 1 tablet by mouth every 8 hours as needed for Nausea or Vomiting 21 tablet 0    hydroxychloroquine (PLAQUENIL) 200 MG tablet TAKE 1 TABLET BY MOUTH EVERY DAY 30 tablet 5    ibuprofen (ADVIL;MOTRIN) 800 MG tablet TAKE 1 TABLET BY MOUTH EVERY 8 HOURS AS NEEDED FOR PAIN DURING WEEK OF ANTICIPATED ONSET OF MENSES 90 tablet 2    acetaminophen (TYLENOL) 325 MG tablet Take 650 mg by mouth every 6 hours as needed for Pain      levothyroxine (SYNTHROID) 100 MCG tablet Take 1 tablet by mouth daily 90 tablet 1    SUBOXONE 8-2 MG FILM SL film   0    Multiple Vitamins-Minerals (MULTIVITAMIN WOMEN) TABS Take by mouth       No current facility-administered medications for this visit. Allergies   Allergen Reactions    Codeine Rash       Health Maintenance   Topic Date Due    COVID-19 Vaccine (1) Never done    DTaP/Tdap/Td vaccine (1 - Tdap) Never done    Flu vaccine (1) Never done    Depression Screen  06/08/2023    Cervical cancer screen  09/20/2024    Hepatitis C screen  Completed    HIV screen  Completed    Hepatitis A vaccine  Aged Out    Hib vaccine  Aged Out    Meningococcal (ACWY) vaccine  Aged Out    Pneumococcal 0-64 years Vaccine  Aged Out    Varicella vaccine  Discontinued       Subjective:      Review of Systems   Constitutional:  Negative for chills and fever. HENT:  Positive for dental problem. Negative for ear pain. Respiratory:  Negative for cough and shortness of breath. Cardiovascular:  Negative for chest pain. Gastrointestinal:  Positive for nausea. Negative for vomiting. Objective:     Physical Exam  Vitals and nursing note reviewed. Constitutional:       General: She is not in acute distress. Appearance: Normal appearance. She is well-developed and normal weight. She is not toxic-appearing. HENT:      Head: Normocephalic and atraumatic.       Right Ear: Tympanic membrane, ear canal and external ear normal.      Left Ear: Tympanic membrane, ear canal and external ear normal.      Nose: Nose normal.      Mouth/Throat:      Lips: Pink. Mouth: Mucous membranes are moist.      Dentition: Abnormal dentition (broken teeth - posterior bottom right and left molars). Dental caries present. Pharynx: Oropharynx is clear. Uvula midline. Comments: Mild gum erythema and swelling along posterior right and left bottom gumlines; no abscess appreciated  Eyes:      General: Lids are normal.      Conjunctiva/sclera: Conjunctivae normal.   Cardiovascular:      Rate and Rhythm: Normal rate and regular rhythm. Heart sounds: Normal heart sounds. No murmur heard. Pulmonary:      Effort: Pulmonary effort is normal.      Breath sounds: Normal breath sounds and air entry. Skin:     General: Skin is warm and dry. Neurological:      General: No focal deficit present. Mental Status: She is alert. Psychiatric:         Mood and Affect: Mood and affect normal.         Behavior: Behavior is cooperative. /72 (Site: Left Upper Arm, Position: Sitting)   Pulse 68   Resp 16   LMP 09/25/2022 (Approximate)   SpO2 98%     Assessment/Plan: Dayana Gil was seen today for dental pain. Diagnoses and all orders for this visit:    Dental infection  -     amoxicillin-clavulanate (AUGMENTIN) 875-125 MG per tablet; Take 1 tablet by mouth 2 times daily for 7 days    Nausea  -     ondansetron (ZOFRAN-ODT) 4 MG disintegrating tablet; Take 1 tablet by mouth every 8 hours as needed for Nausea or Vomiting    71-year-old female with poor dentition/dental caries and broken teeth presenting to the office with dental pain x1 week. Patient is nontoxic-appearing on exam.  Physical exam is significant for inflamed gums posteriorly on bottom and bilaterally. I did not appreciate an abscess on exam; however, since patient is unable to get into a dentist until April 2023, I plan to treat her with Augmentin x7 days to cover for bacterial infection.   I did provide patient with a list of Medicaid dental offices that may be able to get her in sooner-patient planning to contact various places to see if she can get in this week. Patient also asking for a prescription for Zofran to take for nausea. Return if symptoms worsen or fail to improve.     Electronically signed by Laura Landers DO on 10/17/2022 at 12:16 PM

## 2022-11-01 DIAGNOSIS — E03.9 HYPOTHYROIDISM, UNSPECIFIED TYPE: ICD-10-CM

## 2022-11-01 DIAGNOSIS — N94.6 CRAMPY PAIN ASSOCIATED WITH MENSES: ICD-10-CM

## 2022-11-03 RX ORDER — IBUPROFEN 800 MG/1
TABLET ORAL
Qty: 90 TABLET | Refills: 2 | Status: SHIPPED | OUTPATIENT
Start: 2022-11-03

## 2022-11-03 RX ORDER — LEVOTHYROXINE SODIUM 0.1 MG/1
TABLET ORAL
Qty: 30 TABLET | Refills: 5 | Status: SHIPPED | OUTPATIENT
Start: 2022-11-03

## 2022-11-08 ENCOUNTER — TELEPHONE (OUTPATIENT)
Dept: RHEUMATOLOGY | Age: 37
End: 2022-11-08

## 2022-11-08 NOTE — TELEPHONE ENCOUNTER
Notified the patient upcoming appt. Is canceled due to the provider being out of the office.  She will call back to have it rescheduled

## 2022-12-05 ENCOUNTER — OFFICE VISIT (OUTPATIENT)
Dept: FAMILY MEDICINE CLINIC | Age: 37
End: 2022-12-05
Payer: MEDICARE

## 2022-12-05 VITALS
OXYGEN SATURATION: 98 % | RESPIRATION RATE: 16 BRPM | HEIGHT: 60 IN | WEIGHT: 112 LBS | DIASTOLIC BLOOD PRESSURE: 60 MMHG | SYSTOLIC BLOOD PRESSURE: 100 MMHG | BODY MASS INDEX: 21.99 KG/M2 | HEART RATE: 68 BPM

## 2022-12-05 DIAGNOSIS — E03.9 HYPOTHYROIDISM, UNSPECIFIED TYPE: ICD-10-CM

## 2022-12-05 DIAGNOSIS — H73.891 RETRACTION POCKET OF TYMPANIC MEMBRANE OF RIGHT EAR: ICD-10-CM

## 2022-12-05 DIAGNOSIS — Z23 NEED FOR INFLUENZA VACCINATION: ICD-10-CM

## 2022-12-05 DIAGNOSIS — M35.9 UNDIFFERENTIATED CONNECTIVE TISSUE DISEASE (HCC): ICD-10-CM

## 2022-12-05 DIAGNOSIS — R41.840 ATTENTION AND CONCENTRATION DEFICIT: Primary | ICD-10-CM

## 2022-12-05 PROCEDURE — G8482 FLU IMMUNIZE ORDER/ADMIN: HCPCS | Performed by: FAMILY MEDICINE

## 2022-12-05 PROCEDURE — G8420 CALC BMI NORM PARAMETERS: HCPCS | Performed by: FAMILY MEDICINE

## 2022-12-05 PROCEDURE — 1036F TOBACCO NON-USER: CPT | Performed by: FAMILY MEDICINE

## 2022-12-05 PROCEDURE — 90674 CCIIV4 VAC NO PRSV 0.5 ML IM: CPT | Performed by: FAMILY MEDICINE

## 2022-12-05 PROCEDURE — G8427 DOCREV CUR MEDS BY ELIG CLIN: HCPCS | Performed by: FAMILY MEDICINE

## 2022-12-05 PROCEDURE — 90471 IMMUNIZATION ADMIN: CPT | Performed by: FAMILY MEDICINE

## 2022-12-05 PROCEDURE — 99214 OFFICE O/P EST MOD 30 MIN: CPT | Performed by: FAMILY MEDICINE

## 2022-12-05 RX ORDER — BUPROPION HYDROCHLORIDE 150 MG/1
150 TABLET ORAL EVERY MORNING
Qty: 60 TABLET | Refills: 0 | Status: SHIPPED | OUTPATIENT
Start: 2022-12-05

## 2022-12-05 RX ORDER — LEVOTHYROXINE SODIUM 112 UG/1
112 TABLET ORAL DAILY
Qty: 90 TABLET | Refills: 1 | Status: SHIPPED | OUTPATIENT
Start: 2022-12-05

## 2022-12-05 ASSESSMENT — ENCOUNTER SYMPTOMS
SHORTNESS OF BREATH: 0
VOMITING: 0
NAUSEA: 0
CONSTIPATION: 1
BLOOD IN STOOL: 1
DIARRHEA: 0

## 2022-12-05 ASSESSMENT — PATIENT HEALTH QUESTIONNAIRE - PHQ9
SUM OF ALL RESPONSES TO PHQ QUESTIONS 1-9: 0
SUM OF ALL RESPONSES TO PHQ9 QUESTIONS 1 & 2: 0
2. FEELING DOWN, DEPRESSED OR HOPELESS: 0
SUM OF ALL RESPONSES TO PHQ QUESTIONS 1-9: 0
SUM OF ALL RESPONSES TO PHQ QUESTIONS 1-9: 0
1. LITTLE INTEREST OR PLEASURE IN DOING THINGS: 0
SUM OF ALL RESPONSES TO PHQ QUESTIONS 1-9: 0

## 2022-12-05 NOTE — PROGRESS NOTES
[address] [address]  Dept: [phone]  Dept Fax: [phone]  Loc: [phone]      Kavya Johnson is a 40 y.o. female who presents todayfor her medical conditions/complaints as noted below. Kavya Johnson is c/o of Follow-up (Thyroid medication )      :     HPI    Here for follow-up on thyroid. Feeling well overall. Stress test was normal.     Remains on suboxone. Started hydroxychloroquine with rheumatology. Diagnosis was undifferentiated connective tissue disease. This worked well initially. Then shoulder flared up. And tooth flared. And got COVID-19. Skipped medications for a couple days and shoulder felt better. Started again and shoulder felt worse. So stopped medication. Also had tooth pain on the  hydroxychloroquine. Will re-schedule with rheumatology to discuss. This morning had blood in right ear also. Cleaned this up. Also was told she had OCD and ADD in the past.  Was also told she had bipolar disorder but thinks this was thyroid related. Saw Dr. Janeth Iqbal. Feels like this is a big issue for her. Cannot focus and keep on task. Hard to accomplish even grocery shopping. Hard to be on time. Has tried counseling. Maybe helped a bit. Patient Active Problem List   Diagnosis    Hypothyroidism    Chronic fatigue    Thoracogenic scoliosis of thoracic region      Goals    None       The patient is allergic to codeine. Medical History  Kevin Gutiérrez has a past medical history of Fibromyalgia, Hashimoto's thyroiditis, Hypothyroidism, Kidney stone, Neuropathy, Scoliosis, and Seasonal allergies. Past SurgicalHistory  The patient  has a past surgical history that includes [place] tooth extraction. Family History  This patient's family history includes High Blood Pressure in her sister. Social History  Kevin Gutiérrez  reports that she quit smoking about 4 years ago.  Her smoking use included cigarettes. She has a 1.50 pack-year smoking history. She has never used smokeless tobacco. She reports that she does not currently use drugs. She reports that she does not drink alcohol. Medications    Current Outpatient Medications:     levothyroxine (SYNTHROID) 112 MCG tablet, Take 1 tablet by mouth daily, Disp: 90 tablet, Rfl: 1    buPROPion (WELLBUTRIN XL) 150 MG extended release tablet, Take 1 tablet by mouth every morning, Disp: 60 tablet, Rfl: 0    ibuprofen (ADVIL;MOTRIN) 800 MG tablet, TAKE 1 TABLET BY MOUTH EVERY 8 HOURS AS NEEDED FOR PAIN DURING WEEK OF ANTICIPATED ONSET OF MENSES, Disp: 90 tablet, Rfl: 2    ondansetron (ZOFRAN-ODT) 4 MG disintegrating tablet, Take 1 tablet by mouth every 8 hours as needed for Nausea or Vomiting, Disp: 21 tablet, Rfl: 0    acetaminophen (TYLENOL) 325 MG tablet, Take 650 mg by mouth every 6 hours as needed for Pain, Disp: , Rfl:     SUBOXONE 8-2 MG FILM SL film, , Disp: , Rfl: 0    Multiple Vitamins-Minerals (MULTIVITAMIN WOMEN) TABS, Take by mouth, Disp: , Rfl:     hydroxychloroquine (PLAQUENIL) 200 MG tablet, TAKE 1 TABLET BY MOUTH EVERY DAY (Patient not taking: Reported on 12/5/2022), Disp: 30 tablet, Rfl: 5    Subjective:      Review of Systems   Constitutional:  Positive for fatigue (improved). Negative for chills and fever. HENT:  Positive for ear discharge. Respiratory:  Negative for shortness of breath. Cardiovascular:  Negative for chest pain and palpitations. Gastrointestinal:  Positive for blood in stool (mild intermittent) and constipation. Negative for diarrhea, nausea and vomiting. Musculoskeletal:  Positive for arthralgias. Skin:  Positive for rash (resolved on hydroxychloroquine; now intermittent facial). Psychiatric/Behavioral:  Positive for decreased concentration and sleep disturbance. Negative for self-injury and suicidal ideas. The patient is not nervous/anxious.       Objective:     Vitals:    12/05/22 1249   BP: 100/60 Site: Right Upper Arm   Position: Sitting   Cuff Size: Medium Adult   Pulse: 68   Resp: 16   SpO2: 98%   Weight: 112 lb (50.8 kg)   Height: 5' (1.524 m)       Physical Exam  Vitals reviewed. Constitutional:       General: She is not in acute distress. Appearance: She is well-developed. HENT:      Head: Normocephalic and atraumatic. Right Ear: No laceration, drainage, swelling or tenderness. No middle ear effusion. There is no impacted cerumen. No foreign body. No mastoid tenderness. No PE tube. No hemotympanum. Tympanic membrane is retracted. Tympanic membrane is not injected, scarred, perforated, erythematous or bulging. Left Ear: No laceration, drainage, swelling or tenderness. No middle ear effusion. There is no impacted cerumen. No foreign body. No mastoid tenderness. No PE tube. No hemotympanum. Tympanic membrane is not injected, scarred, perforated, erythematous, retracted or bulging. Ears:      Comments: Retraction pocket on right with intact membrane. No visible bleeding. Eyes:      Conjunctiva/sclera: Conjunctivae normal.   Cardiovascular:      Rate and Rhythm: Normal rate and regular rhythm. Heart sounds: Normal heart sounds. Pulmonary:      Effort: Pulmonary effort is normal. No respiratory distress. Breath sounds: Normal breath sounds. Abdominal:      Palpations: Abdomen is soft. Tenderness: There is no abdominal tenderness. Musculoskeletal:      Cervical back: Neck supple. Skin:     General: Skin is warm and dry. Comments: No obvious rash. Neurological:      Mental Status: She is alert. Comments: No obvious focal deficit. Psychiatric:         Attention and Perception: Attention and perception normal.         Mood and Affect: Mood and affect normal.         Speech: Speech normal.         Behavior: Behavior normal.         Thought Content:  Thought content normal.         Cognition and Memory: Cognition and memory normal. Judgment: Judgment normal.      Comments: Reassuring exam. Mildly anxious. Otherwise euthymic. Lab Results   Component Value Date    WBC 8.0 06/27/2022    HGB 13.6 06/27/2022    HCT 43.4 06/27/2022     06/27/2022    HDL 92 09/13/2021    ALT 9 (L) 06/27/2022    AST 10 06/27/2022     06/27/2022    K 3.7 06/27/2022     06/27/2022    CREATININE 0.8 06/27/2022    BUN 12 06/27/2022    CO2 30 06/27/2022    TSH 3.860 06/27/2022    LABA1C 4.9 09/16/2015       /Plan:   1. Hypothyroidism, unspecified type  Close to goal but TSH at high end of range. Will bump slightly given constipation and fatigue and repeat in 6 weeks. - levothyroxine (SYNTHROID) 112 MCG tablet; Take 1 tablet by mouth daily  Dispense: 90 tablet; Refill: 1  - TSH With Reflex Ft4; Future    2. Attention and concentration deficit  Ongoing issue. No record of formal diagnosis. Strattera has interaction with suboxone. Will trial Wellbutrin. Consider referral for more extensive psychological testing. Indications for prompt return and/or emergent presentation if worsening reviewed in detail.      - buPROPion (WELLBUTRIN XL) 150 MG extended release tablet; Take 1 tablet by mouth every morning  Dispense: 60 tablet; Refill: 0    3. Undifferentiated connective tissue disease ([facility] [address]  Will follow with rheumatology. Indications for prompt return and/or emergent presentation if worsening reviewed in detail. 4. Retraction pocket of tympanic membrane of right ear  Will follow. Reassuring exam overall. 5. Need for influenza vaccination  Given.    - Influenza, FLUCELVAX, (age 10 mo+), IM, Preservative Free, 0.5 mL    Colonoscopy is scheduled for December 16th. Return in about 7 weeks (around 1/23/2023).     Orders Placed   Orders Placed This Encounter   Procedures    Influenza, FLUCELVAX, (age 10 mo+), IM, Preservative Free, 0.5 mL    TSH With Reflex Ft4     Standing Status:   Future     Standing Expiration Date:   12/5/2023 Prescriptions given/sent  Orders Placed This Encounter   Medications    levothyroxine (SYNTHROID) 112 MCG tablet     Sig: Take 1 tablet by mouth daily     Dispense:  90 tablet     Refill:  1    buPROPion (WELLBUTRIN XL) 150 MG extended release tablet     Sig: Take 1 tablet by mouth every morning     Dispense:  60 tablet     Refill:  0       Patient given educational materials - see patient instructions. Discussed use, benefit, and side effects of recommended medications. All patient questions answered. Pt voiced understanding. Reviewed health maintenance; flu shot given. Believe tetanus shot up to date. Encouraged COVID-19 vaccine at pharmacy.              Electronically signed by Jessica Cannon MD on 12/5/2022 at 1:42 PM

## 2022-12-08 ENCOUNTER — TELEPHONE (OUTPATIENT)
Dept: FAMILY MEDICINE CLINIC | Age: 37
End: 2022-12-08

## 2022-12-08 DIAGNOSIS — N89.8 VAGINAL DISCHARGE: ICD-10-CM

## 2022-12-08 RX ORDER — METRONIDAZOLE 7.5 MG/G
1 GEL VAGINAL EVERY EVENING
Qty: 70 G | Refills: 0 | Status: SHIPPED | OUTPATIENT
Start: 2022-12-08 | End: 2022-12-13

## 2022-12-08 NOTE — TELEPHONE ENCOUNTER
Pt called stating that she has a bacterial infection. Complaining of cramping and vaginal inflammation. Pt states that she get these often and metronidazole gel 0.75% is sent in for her and helps. Pt states that she does not have time to make it into office today. Requesting script be sent to [facility] pharmacy?

## 2022-12-09 NOTE — TELEPHONE ENCOUNTER
Reviewed lab results and called this in. Patient was just seen and does have a history of bacterial vaginosis. Note patient has also had candida in the past. If symptoms are note resolved with treatment should present for testing and additional evaluation. Thanks.

## 2022-12-13 ENCOUNTER — TELEPHONE (OUTPATIENT)
Dept: FAMILY MEDICINE CLINIC | Age: 37
End: 2022-12-13

## 2022-12-13 NOTE — TELEPHONE ENCOUNTER
Patient called. Started wellbutrin and feeling no side effects  and would like to try a higher dose as was discussed. Please advise. [facility] is her pharmacy. Please advise. Thank you.

## 2022-12-15 DIAGNOSIS — R41.840 ATTENTION AND CONCENTRATION DEFICIT: ICD-10-CM

## 2022-12-15 RX ORDER — BUPROPION HYDROCHLORIDE 300 MG/1
300 TABLET ORAL EVERY MORNING
Qty: 90 TABLET | Refills: 0 | Status: SHIPPED | OUTPATIENT
Start: 2022-12-15

## 2023-01-31 DIAGNOSIS — R41.840 ATTENTION AND CONCENTRATION DEFICIT: ICD-10-CM

## 2023-01-31 NOTE — TELEPHONE ENCOUNTER
Patient's last appointment was : 12/5/2022  Patient's next appointment is : Visit date not found  Last refilled:  12/15/222

## 2023-02-02 RX ORDER — BUPROPION HYDROCHLORIDE 150 MG/1
TABLET ORAL
Qty: 60 TABLET | Refills: 0 | OUTPATIENT
Start: 2023-02-02

## 2023-02-02 NOTE — TELEPHONE ENCOUNTER
Refused refill; I believe has script for 300 mg tablets. Please confirm with pharmacy. We recently increased the dose. Thanks.

## 2023-02-07 DIAGNOSIS — N94.6 CRAMPY PAIN ASSOCIATED WITH MENSES: ICD-10-CM

## 2023-02-07 NOTE — TELEPHONE ENCOUNTER
Called patient- no answer- left message for patient to return call    Patient's last appointment was : 12/5/2022  Patient's next appointment is : Visit date not found  Last refilled: 11/3/2022

## 2023-02-08 RX ORDER — IBUPROFEN 800 MG/1
TABLET ORAL
Qty: 90 TABLET | Refills: 2 | OUTPATIENT
Start: 2023-02-08

## 2023-02-08 NOTE — TELEPHONE ENCOUNTER
Spoke with patient- she states that at this time she does not need a refill on her IBU- will let us know when she does need one

## 2023-02-08 NOTE — TELEPHONE ENCOUNTER
Spoke with patient - she states that she is taking Wellbutrin 300 mg but thinks the dose could still be increased a bit- appointment made for 2/20/23

## 2023-02-20 ENCOUNTER — OFFICE VISIT (OUTPATIENT)
Dept: FAMILY MEDICINE CLINIC | Age: 38
End: 2023-02-20

## 2023-02-20 VITALS
BODY MASS INDEX: 20.97 KG/M2 | HEIGHT: 60 IN | SYSTOLIC BLOOD PRESSURE: 110 MMHG | OXYGEN SATURATION: 98 % | RESPIRATION RATE: 16 BRPM | DIASTOLIC BLOOD PRESSURE: 76 MMHG | WEIGHT: 106.8 LBS | HEART RATE: 66 BPM

## 2023-02-20 DIAGNOSIS — R53.82 CHRONIC FATIGUE: ICD-10-CM

## 2023-02-20 DIAGNOSIS — E03.9 HYPOTHYROIDISM, UNSPECIFIED TYPE: Primary | ICD-10-CM

## 2023-02-20 DIAGNOSIS — K62.5 RECTAL BLEEDING: ICD-10-CM

## 2023-02-20 DIAGNOSIS — R41.840 ATTENTION AND CONCENTRATION DEFICIT: ICD-10-CM

## 2023-02-20 DIAGNOSIS — M35.9 UNDIFFERENTIATED CONNECTIVE TISSUE DISEASE (HCC): ICD-10-CM

## 2023-02-20 LAB — TSH SERPL DL<=0.005 MIU/L-ACNC: 3.08 UIU/ML (ref 0.4–4.2)

## 2023-02-20 RX ORDER — BUPROPION HYDROCHLORIDE 300 MG/1
300 TABLET ORAL EVERY MORNING
Qty: 90 TABLET | Refills: 1 | Status: SHIPPED | OUTPATIENT
Start: 2023-02-20

## 2023-02-20 ASSESSMENT — ENCOUNTER SYMPTOMS
CONSTIPATION: 1
VOMITING: 0
BLOOD IN STOOL: 1
NAUSEA: 0
DIARRHEA: 0
SHORTNESS OF BREATH: 0

## 2023-02-20 NOTE — PROGRESS NOTES
[address] [address]  Dept: [phone]  Dept Fax: [phone]  Loc: [phone]      Ezekiel Ledbetter is a 40 y.o. female who presents todayfor her medical conditions/complaints as noted below. Ezekiel Ledbetter is c/o of Follow-up (ADHD, discuss thyroid medications )      :     HPI    Here for follow-up today. Doing well. Was started on Wellbutrin for attention and concentration. This is working well. Currently on 300 mg. Wondered about going up to 450mg but thinks working well. Not feeling depressed. Focus is better also. Still a few residual issues with anxiety and task completion and procrastination. Less frustrated. Anna Alegria is age 25 and step sons are in 19's. Leobardo Hendrickson is age 6. Thyroid was at goal at last check but due for repeat TSH. Will draw this today. Was set up to see gastroenterology but they re-scheduled twice. Rx was over the counter and did not complete prep. Supposed to get scheduled again. Stopped hydroychloroquine due to dental and shoulder pain. Will re-schedule with rheumatology. Antibiotics seem to help with constipation and abdominal pain. Patient Active Problem List   Diagnosis    Hypothyroidism    Chronic fatigue    Thoracogenic scoliosis of thoracic region      Goals    None       The patient is allergic to codeine. Medical History  [facility] has a past medical history of Fibromyalgia, Hashimoto's thyroiditis, Hypothyroidism, Kidney stone, Neuropathy, Scoliosis, and Seasonal allergies. Past SurgicalHistory  The patient  has a past surgical history that includes [place] tooth extraction. Family History  This patient's family history includes High Blood Pressure in her sister. Social History  [facility]  reports that she quit smoking about 5 years ago. Her smoking use included cigarettes. She has a 1.50 pack-year smoking history.  She has never used smokeless tobacco. She reports that she does not currently use drugs. She reports that she does not drink alcohol. Medications    Current Outpatient Medications:     buPROPion (WELLBUTRIN XL) 300 MG extended release tablet, Take 1 tablet by mouth every morning, Disp: 90 tablet, Rfl: 1    levothyroxine (SYNTHROID) 112 MCG tablet, Take 1 tablet by mouth daily, Disp: 90 tablet, Rfl: 1    ibuprofen (ADVIL;MOTRIN) 800 MG tablet, TAKE 1 TABLET BY MOUTH EVERY 8 HOURS AS NEEDED FOR PAIN DURING WEEK OF ANTICIPATED ONSET OF MENSES, Disp: 90 tablet, Rfl: 2    ondansetron (ZOFRAN-ODT) 4 MG disintegrating tablet, Take 1 tablet by mouth every 8 hours as needed for Nausea or Vomiting, Disp: 21 tablet, Rfl: 0    acetaminophen (TYLENOL) 325 MG tablet, Take 650 mg by mouth every 6 hours as needed for Pain, Disp: , Rfl:     SUBOXONE 8-2 MG FILM SL film, , Disp: , Rfl: 0    Multiple Vitamins-Minerals (MULTIVITAMIN WOMEN) TABS, Take by mouth, Disp: , Rfl:     Subjective:      Review of Systems   Constitutional:  Negative for chills, fatigue and fever. HENT:  Negative for ear discharge. Respiratory:  Negative for shortness of breath. Cardiovascular:  Negative for chest pain and palpitations. Gastrointestinal:  Positive for blood in stool (mild intermittent) and constipation. Negative for diarrhea, nausea and vomiting. Musculoskeletal:  Positive for arthralgias (improved). Skin:  Positive for rash (resolved on hydroxychloroquine; now intermittent facial). Psychiatric/Behavioral:  Positive for decreased concentration (improved). Negative for self-injury, sleep disturbance (improved) and suicidal ideas. The patient is not nervous/anxious. Objective:     Vitals:    02/20/23 1218   BP: 110/76   Site: Left Upper Arm   Position: Sitting   Cuff Size: Medium Adult   Pulse: 66   Resp: 16   SpO2: 98%   Weight: 106 lb 12.8 oz (48.4 kg)   Height: 5' (1.524 m)       Physical Exam  Vitals reviewed.    Constitutional:       General: She is not in acute distress. Appearance: She is well-developed. She is not ill-appearing or toxic-appearing. HENT:      Head: Normocephalic and atraumatic. Right Ear: Ear canal and external ear normal.      Left Ear: Tympanic membrane, ear canal and external ear normal.      Ears:      Comments: Retraction pocket on right but otherwise normal exam.  No perforation. Mouth/Throat:      Mouth: Mucous membranes are moist.   Eyes:      General:         Right eye: No discharge. Left eye: No discharge. Conjunctiva/sclera: Conjunctivae normal.      Pupils: Pupils are equal, round, and reactive to light. Neck:      Thyroid: No thyroid mass, thyromegaly or thyroid tenderness. Cardiovascular:      Rate and Rhythm: Normal rate and regular rhythm. Heart sounds: Normal heart sounds. Pulmonary:      Effort: Pulmonary effort is normal. No respiratory distress. Breath sounds: Normal breath sounds. Abdominal:      General: There is no distension. Palpations: Abdomen is soft. There is no mass. Tenderness: There is no abdominal tenderness. There is no guarding or rebound. Hernia: No hernia is present. Musculoskeletal:      Cervical back: Neck supple. Skin:     General: Skin is warm and dry. Comments: No obvious rash. Neurological:      Mental Status: She is alert. Comments: No obvious focal deficit. Psychiatric:         Mood and Affect: Mood normal.         Behavior: Behavior normal.         Thought Content: Thought content normal.         Judgment: Judgment normal.       Lab Results   Component Value Date    WBC 8.0 06/27/2022    HGB 13.6 06/27/2022    HCT 43.4 06/27/2022     06/27/2022    HDL 92 09/13/2021    ALT 9 (L) 06/27/2022    AST 10 06/27/2022     06/27/2022    K 3.7 06/27/2022     06/27/2022    CREATININE 0.8 06/27/2022    BUN 12 06/27/2022    CO2 30 06/27/2022    TSH 3.860 06/27/2022    LABA1C 4.9 09/16/2015       /Plan:   1. Attention and concentration deficit  Doing well on Wellbutrin. Continue this. Indications for prompt return and/or emergent presentation if worsening reviewed in detail.    - buPROPion (WELLBUTRIN XL) 300 MG extended release tablet; Take 1 tablet by mouth every morning  Dispense: 90 tablet; Refill: 1    2. Hypothyroidism, unspecified type  Was slightly hypothyroid on last check. Now status post dose increase. Will repeat TSH. Continue synthroid. - TSH With Reflex Ft4    3. Chronic fatigue  Improved on Wellbutrin. Will follow. 4. Undifferentiated connective tissue disease ([facility] [address]  Stopped hydroxychloroquine with side effects. Encourage follow-up with rheumatology. Agrees to schedule. 5. Rectal bleeding  Intermittent. Agrees to re-schedule scope with gastroenterology. Return in about 3 months (around 5/20/2023) for Follow-up chronic conditions. Orders Placed   No orders of the defined types were placed in this encounter. Prescriptions given/sent  Orders Placed This Encounter   Medications    buPROPion (WELLBUTRIN XL) 300 MG extended release tablet     Sig: Take 1 tablet by mouth every morning     Dispense:  90 tablet     Refill:  1       Patient given educational materials - see patient instructions. Discussed use, benefit, and side effects of recommended medications. All patient questions answered. Pt voiced understanding. Reviewed health maintenance encouraged tetanus shot and COVID-19 vaccines. Will get tetanus shot at pharmacy. Pre-contemplative re: [id number].             Electronically signed by Duc Ledezma MD on 2/20/2023 at 12:36 PM

## 2023-02-24 ENCOUNTER — TELEPHONE (OUTPATIENT)
Dept: FAMILY MEDICINE CLINIC | Age: 38
End: 2023-02-24

## 2023-02-24 NOTE — TELEPHONE ENCOUNTER
----- Message from Kwasi Maria MD sent at 2/23/2023  5:19 PM EST -----  TSH is in range but still higher than 2.5. Please reach out to patient. If feeling well, no changes are needed and can re-check in 6 months. If having fatigue, depression, dry skin, weight gain, constipation, or other symptoms, could bump dose again to 125 mcg and re-check in 6 weeks. Let me know if dose increase is desired. Thanks.

## 2023-02-27 DIAGNOSIS — E03.9 HYPOTHYROIDISM, UNSPECIFIED TYPE: ICD-10-CM

## 2023-02-27 RX ORDER — LEVOTHYROXINE SODIUM 0.12 MG/1
125 TABLET ORAL DAILY
Qty: 90 TABLET | Refills: 1 | Status: SHIPPED | OUTPATIENT
Start: 2023-02-27

## 2023-03-07 ENCOUNTER — OFFICE VISIT (OUTPATIENT)
Dept: FAMILY MEDICINE CLINIC | Age: 38
End: 2023-03-07

## 2023-03-07 VITALS
WEIGHT: 108.2 LBS | DIASTOLIC BLOOD PRESSURE: 72 MMHG | OXYGEN SATURATION: 97 % | HEIGHT: 60 IN | SYSTOLIC BLOOD PRESSURE: 106 MMHG | HEART RATE: 88 BPM | BODY MASS INDEX: 21.24 KG/M2 | RESPIRATION RATE: 16 BRPM

## 2023-03-07 DIAGNOSIS — N94.6 CRAMPY PAIN ASSOCIATED WITH MENSES: ICD-10-CM

## 2023-03-07 DIAGNOSIS — R39.9 UTI SYMPTOMS: Primary | ICD-10-CM

## 2023-03-07 DIAGNOSIS — R10.2 PELVIC PAIN: ICD-10-CM

## 2023-03-07 DIAGNOSIS — N89.8 VAGINAL DISCHARGE: ICD-10-CM

## 2023-03-07 LAB
BILIRUBIN, POC: NORMAL
BLOOD URINE, POC: NORMAL
CLARITY, POC: NORMAL
COLOR, POC: YELLOW
GLUCOSE URINE, POC: NORMAL
KETONES, POC: NORMAL
LEUKOCYTE EST, POC: NORMAL
NITRITE, POC: NORMAL
PH, POC: 5
PROTEIN, POC: NORMAL
SPECIFIC GRAVITY, POC: 1.02
UROBILINOGEN, POC: 0.2

## 2023-03-07 RX ORDER — METRONIDAZOLE 500 MG/1
500 TABLET ORAL 2 TIMES DAILY
Qty: 14 TABLET | Refills: 0 | Status: SHIPPED | OUTPATIENT
Start: 2023-03-07 | End: 2023-03-14

## 2023-03-07 RX ORDER — AZITHROMYCIN 500 MG/1
1000 TABLET, FILM COATED ORAL
Qty: 4 TABLET | Refills: 0 | Status: SHIPPED | OUTPATIENT
Start: 2023-03-07 | End: 2023-03-15

## 2023-03-07 RX ORDER — IBUPROFEN 800 MG/1
800 TABLET ORAL EVERY 8 HOURS PRN
Qty: 90 TABLET | Refills: 3 | Status: SHIPPED | OUTPATIENT
Start: 2023-03-07 | End: 2024-03-06

## 2023-03-07 RX ORDER — NITROFURANTOIN 25; 75 MG/1; MG/1
100 CAPSULE ORAL 2 TIMES DAILY
Qty: 14 CAPSULE | Refills: 0 | Status: CANCELLED | OUTPATIENT
Start: 2023-03-07 | End: 2023-03-14

## 2023-03-07 NOTE — PROGRESS NOTES
[address] [facility]  [address] [facility] [address]  Dept: [phone]  Dept Fax: [phone]  Loc: [phone]      Keiko Holbrook is a 40 y.o. female who presents todayfor Urinary Tract Infection (Pt states she is having cramping and feels uncomfortable )      HPI:      Patient presents with:  Urinary Tract Infection: Pt states she is having cramping and feels uncomfortable     Pain in pelvic area,   Vaginal discharge  Pain with intercourse     No vaginal discharge   2/23 last day         The patient is allergic to codeine. Past MedicalHistory  Kina Nixon  has a past medical history of Fibromyalgia, Hashimoto's thyroiditis, Hypothyroidism, Kidney stone, Neuropathy, Scoliosis, and Seasonal allergies. Medications    Current Outpatient Medications:     metroNIDAZOLE (FLAGYL) 500 MG tablet, Take 1 tablet by mouth in the morning and at bedtime for 7 days, Disp: 14 tablet, Rfl: 0    azithromycin (ZITHROMAX) 500 MG tablet, Take 2 tablets by mouth every 7 days for 2 doses, Disp: 4 tablet, Rfl: 0    ibuprofen (ADVIL;MOTRIN) 800 MG tablet, Take 1 tablet by mouth every 8 hours as needed for Pain, Disp: 90 tablet, Rfl: 3    levothyroxine (SYNTHROID) 125 MCG tablet, Take 1 tablet by mouth daily, Disp: 90 tablet, Rfl: 1    buPROPion (WELLBUTRIN XL) 300 MG extended release tablet, Take 1 tablet by mouth every morning, Disp: 90 tablet, Rfl: 1    ondansetron (ZOFRAN-ODT) 4 MG disintegrating tablet, Take 1 tablet by mouth every 8 hours as needed for Nausea or Vomiting, Disp: 21 tablet, Rfl: 0    acetaminophen (TYLENOL) 325 MG tablet, Take 650 mg by mouth every 6 hours as needed for Pain, Disp: , Rfl:     SUBOXONE 8-2 MG FILM SL film, , Disp: , Rfl: 0    Multiple Vitamins-Minerals (MULTIVITAMIN WOMEN) TABS, Take by mouth, Disp: , Rfl:     Subjective:      Review of Systems   Constitutional:  Negative for appetite change, chills, fatigue and fever.    HENT:  Negative for congestion, ear discharge, sinus pressure, tinnitus and voice change. Eyes:  Negative for pain, discharge, itching and visual disturbance. Respiratory:  Negative for cough, choking, chest tightness, shortness of breath and wheezing. Cardiovascular:  Negative for chest pain, palpitations and leg swelling. Gastrointestinal:  Negative for abdominal distention, abdominal pain, constipation, nausea and vomiting. Endocrine: Negative for cold intolerance and heat intolerance. Genitourinary:  Positive for pelvic pain (vaginal discharge). Negative for dysuria, hematuria, vaginal discharge and vaginal pain. Musculoskeletal:  Negative for arthralgias, back pain, gait problem, neck pain and neck stiffness. Skin:  Negative for color change and rash. Neurological:  Negative for dizziness, syncope, speech difficulty, light-headedness, numbness and headaches. Psychiatric/Behavioral:  Negative for behavioral problems, confusion, self-injury and suicidal ideas. The patient is not nervous/anxious. Objective:        Vitals:    03/07/23 1531   BP: 106/72   Site: Left Upper Arm   Position: Sitting   Cuff Size: Medium Adult   Pulse: 88   Resp: 16   SpO2: 97%   Weight: 108 lb 3.2 oz (49.1 kg)   Height: 5' (1.524 m)      Physical Exam  Vitals and nursing note reviewed. Constitutional:       General: She is not in acute distress. Appearance: She is well-developed. She is not diaphoretic. HENT:      Head: Normocephalic and atraumatic. Right Ear: Tympanic membrane and external ear normal. Tympanic membrane is not injected or erythematous. Left Ear: Tympanic membrane and external ear normal. Tympanic membrane is not injected or erythematous. Nose: Nose normal.      Mouth/Throat:      Pharynx: Uvula midline. Eyes:      General:         Right eye: No discharge. Left eye: No discharge.       Conjunctiva/sclera: Conjunctivae normal.      Pupils: Pupils are equal, round, and reactive to light.   Neck:      Thyroid: No thyromegaly. Trachea: Trachea normal.   Cardiovascular:      Rate and Rhythm: Normal rate and regular rhythm. Pulses: Normal pulses. Heart sounds: Normal heart sounds, S1 normal and S2 normal. No murmur heard. No friction rub. No gallop. Pulmonary:      Effort: Pulmonary effort is normal. No respiratory distress. Breath sounds: Normal breath sounds. No wheezing or rales. Chest:      Chest wall: No tenderness. Abdominal:      General: Bowel sounds are normal. There is no distension. Palpations: Abdomen is soft. There is no mass. Tenderness: There is no abdominal tenderness. There is no right CVA tenderness, left CVA tenderness, guarding or rebound. Hernia: No hernia is present. Musculoskeletal:         General: No swelling, tenderness, deformity or signs of injury. Normal range of motion. Cervical back: Full passive range of motion without pain, normal range of motion and neck supple. Lymphadenopathy:      Cervical: No cervical adenopathy. Skin:     General: Skin is warm and dry. Capillary Refill: Capillary refill takes less than 2 seconds. Neurological:      Mental Status: She is alert and oriented to person, place, and time. Deep Tendon Reflexes: Reflexes are normal and symmetric. Assessment/Plan: Catrina Officer was seen today for urinary tract infection. Diagnoses and all orders for this visit:    UTI symptoms  Ua in office unremarkable,. Sending for culture   -     POCT Urinalysis No Micro (Auto)  -     Culture, Urine    Pelvic pain  Deferred exam today. UA unremarkable. Vaginal cultures obtained. Hx of BV, will start Flagyl and Zithromax for PID  -     Culture, Urine  -     metroNIDAZOLE (FLAGYL) 500 MG tablet; Take 1 tablet by mouth in the morning and at bedtime for 7 days  -     azithromycin (ZITHROMAX) 500 MG tablet;  Take 2 tablets by mouth every 7 days for 2 doses  -     Vaginal Pathogens DNA Panel; Future  -     Vaginal Pathogens DNA Panel    Crampy pain associated with menses  -     ibuprofen (ADVIL;MOTRIN) 800 MG tablet; Take 1 tablet by mouth every 8 hours as needed for Pain    Vaginal discharge    Other orders  -     Vaginitis DNA Probe      Return in about 1 week (around 3/14/2023) for Discuss results. Patient instructions given and reviewed.     Electronicallysigned by ERASMO Pereira CNP on 3/12/2023 at 8:27 PM

## 2023-03-08 ENCOUNTER — TELEPHONE (OUTPATIENT)
Dept: FAMILY MEDICINE CLINIC | Age: 38
End: 2023-03-08

## 2023-03-08 LAB — BACTERIA UR CULT: NORMAL

## 2023-03-08 NOTE — RESULT ENCOUNTER NOTE
Notify Lula that her urine culture did not show any bacteria, still waiting on vaginal swab results.

## 2023-03-08 NOTE — TELEPHONE ENCOUNTER
----- Message from ERASMO Calhoun CNP sent at 3/8/2023  2:54 PM EST -----  Efrain Hampton that her urine culture did not show any bacteria, still waiting on vaginal swab results.

## 2023-03-09 ENCOUNTER — TELEPHONE (OUTPATIENT)
Dept: FAMILY MEDICINE CLINIC | Age: 38
End: 2023-03-09

## 2023-03-09 NOTE — RESULT ENCOUNTER NOTE
Notify patient that her Bacterial Vaginosis swab was positive. I sent in Flagyl when I saw her in the office, reassure her that the Flagyl will treat the infection. The yeast was negative.

## 2023-03-12 ASSESSMENT — ENCOUNTER SYMPTOMS
CHEST TIGHTNESS: 0
SHORTNESS OF BREATH: 0
ABDOMINAL PAIN: 0
ABDOMINAL DISTENTION: 0
CHOKING: 0
SINUS PRESSURE: 0
NAUSEA: 0
COUGH: 0
EYE ITCHING: 0
CONSTIPATION: 0
EYE PAIN: 0
COLOR CHANGE: 0
VOICE CHANGE: 0
EYE DISCHARGE: 0
VOMITING: 0
BACK PAIN: 0
WHEEZING: 0

## 2023-03-13 NOTE — TELEPHONE ENCOUNTER
Patient aware of results and voiced understanding. Pt will call office if sxs do not continue to improve or worsen.

## 2023-03-17 ENCOUNTER — TELEPHONE (OUTPATIENT)
Dept: FAMILY MEDICINE CLINIC | Age: 38
End: 2023-03-17

## 2023-03-17 DIAGNOSIS — B37.31 VAGINAL YEAST INFECTION: Primary | ICD-10-CM

## 2023-03-17 RX ORDER — FLUCONAZOLE 150 MG/1
150 TABLET ORAL
Qty: 2 TABLET | Refills: 1 | Status: SHIPPED | OUTPATIENT
Start: 2023-03-17 | End: 2023-03-23

## 2023-03-17 NOTE — TELEPHONE ENCOUNTER
Pt is asking a script for a yeast infection. Finished antibiotic and is now having yeast issues.  Please send to [facility]

## 2023-03-22 ENCOUNTER — OFFICE VISIT (OUTPATIENT)
Dept: RHEUMATOLOGY | Age: 38
End: 2023-03-22
Payer: MEDICAID

## 2023-03-22 ENCOUNTER — NURSE ONLY (OUTPATIENT)
Dept: LAB | Age: 38
End: 2023-03-22

## 2023-03-22 VITALS
HEIGHT: 60 IN | HEART RATE: 83 BPM | OXYGEN SATURATION: 99 % | BODY MASS INDEX: 21.2 KG/M2 | WEIGHT: 108 LBS | SYSTOLIC BLOOD PRESSURE: 102 MMHG | DIASTOLIC BLOOD PRESSURE: 68 MMHG

## 2023-03-22 DIAGNOSIS — M35.9 UNDIFFERENTIATED CONNECTIVE TISSUE DISEASE (HCC): Primary | ICD-10-CM

## 2023-03-22 DIAGNOSIS — R76.8 POSITIVE ANA (ANTINUCLEAR ANTIBODY): ICD-10-CM

## 2023-03-22 DIAGNOSIS — M35.9 UNDIFFERENTIATED CONNECTIVE TISSUE DISEASE (HCC): ICD-10-CM

## 2023-03-22 LAB
ALBUMIN SERPL BCG-MCNC: 4.6 G/DL (ref 3.5–5.1)
ALP SERPL-CCNC: 55 U/L (ref 38–126)
ALT SERPL W/O P-5'-P-CCNC: 15 U/L (ref 11–66)
ANION GAP SERPL CALC-SCNC: 12 MEQ/L (ref 8–16)
AST SERPL-CCNC: 16 U/L (ref 5–40)
BASOPHILS ABSOLUTE: 0 THOU/MM3 (ref 0–0.1)
BASOPHILS NFR BLD AUTO: 0.8 %
BILIRUB SERPL-MCNC: 0.3 MG/DL (ref 0.3–1.2)
BUN SERPL-MCNC: 17 MG/DL (ref 7–22)
CALCIUM SERPL-MCNC: 9.6 MG/DL (ref 8.5–10.5)
CHLORIDE SERPL-SCNC: 104 MEQ/L (ref 98–111)
CO2 SERPL-SCNC: 25 MEQ/L (ref 23–33)
CREAT SERPL-MCNC: 0.7 MG/DL (ref 0.4–1.2)
CREAT UR-MCNC: 71.6 MG/DL
CRP SERPL-MCNC: < 0.3 MG/DL (ref 0–1)
DEPRECATED RDW RBC AUTO: 48.6 FL (ref 35–45)
EOSINOPHIL NFR BLD AUTO: 0.7 %
EOSINOPHILS ABSOLUTE: 0 THOU/MM3 (ref 0–0.4)
ERYTHROCYTE [DISTWIDTH] IN BLOOD BY AUTOMATED COUNT: 13.6 % (ref 11.5–14.5)
ERYTHROCYTE [SEDIMENTATION RATE] IN BLOOD BY WESTERGREN METHOD: 2 MM/HR (ref 0–20)
GFR SERPL CREATININE-BSD FRML MDRD: > 60 ML/MIN/1.73M2
GLUCOSE SERPL-MCNC: 80 MG/DL (ref 70–108)
HCT VFR BLD AUTO: 44.4 % (ref 37–47)
HGB BLD-MCNC: 13.9 GM/DL (ref 12–16)
IMM GRANULOCYTES # BLD AUTO: 0.01 THOU/MM3 (ref 0–0.07)
IMM GRANULOCYTES NFR BLD AUTO: 0.2 %
LYMPHOCYTES ABSOLUTE: 0.9 THOU/MM3 (ref 1–4.8)
LYMPHOCYTES NFR BLD AUTO: 14.9 %
MCH RBC QN AUTO: 30.3 PG (ref 26–33)
MCHC RBC AUTO-ENTMCNC: 31.3 GM/DL (ref 32.2–35.5)
MCV RBC AUTO: 96.7 FL (ref 81–99)
MONOCYTES ABSOLUTE: 0.5 THOU/MM3 (ref 0.4–1.3)
MONOCYTES NFR BLD AUTO: 8.1 %
NEUTROPHILS NFR BLD AUTO: 75.3 %
NRBC BLD AUTO-RTO: 0 /100 WBC
PLATELET # BLD AUTO: 322 THOU/MM3 (ref 130–400)
PMV BLD AUTO: 8.8 FL (ref 9.4–12.4)
POTASSIUM SERPL-SCNC: 4.8 MEQ/L (ref 3.5–5.2)
PROT SERPL-MCNC: 7.1 G/DL (ref 6.1–8)
PROT UR-MCNC: 9 MG/DL
PROT/CREAT 24H UR: 0.13 MG/G{CREAT}
RBC # BLD AUTO: 4.59 MILL/MM3 (ref 4.2–5.4)
SEGMENTED NEUTROPHILS ABSOLUTE COUNT: 4.4 THOU/MM3 (ref 1.8–7.7)
SODIUM SERPL-SCNC: 141 MEQ/L (ref 135–145)
WBC # BLD AUTO: 5.9 THOU/MM3 (ref 4.8–10.8)

## 2023-03-22 PROCEDURE — 99214 OFFICE O/P EST MOD 30 MIN: CPT | Performed by: INTERNAL MEDICINE

## 2023-03-22 ASSESSMENT — ENCOUNTER SYMPTOMS
COUGH: 0
SHORTNESS OF BREATH: 1
ABDOMINAL PAIN: 1
VOMITING: 0
DIARRHEA: 0

## 2023-03-22 NOTE — PROGRESS NOTES
[address] [facility] Rheumatology  Rheumatology Clinic Note      3/22/2023       CHIEF COMPLAINT:    Chief Complaint   Patient presents with    Follow-up     3 mnth f/u positve liam Undifferentiated connective tissue disease ([address]  Pt stated pain 2/10   L side of body              HISTORY OF PRESENT ILLNESS:    40 y.o. female iwsuzie h/o Hashimoto's thyroiditis and undifferentiated connective tissue disease (+ LIAM with titer 1:1280, joint pain, skin rash, oral ulcers, mild leukopenia) presents for follow up. She was on Plaquenil 200 mg daily. Reports that she stopped Plaquenil as she experienced pain in her left shoulder. It was very severe and debilitating. Has seen orthopedics for this and was felt that this is related to scoliosis. However, this pain resolved after missing Plaquenil dose for coupe of days. When she took the hydroxychloroquine afterwards, the left shoulder pain recurred. So she stopped taking it. Since stopping Plaquenil, reports having episodes of \"inflammation in her joints\". On average, lasts for few days and may recur ever several weeks. She noticed that certain food categories seem to be a big trigger. Has milder episodes of oral ulcers and skin rash, but nothing as prior to starting Plaquenil in the first place. Reports that she is in the end of a flare up. Has mild tolerable pain in the left hand, left shoulder, left knee, left foot, left lower back. Past Medical History:     has a past medical history of Fibromyalgia, Hashimoto's thyroiditis, Hypothyroidism, Kidney stone, Neuropathy, Scoliosis, and Seasonal allergies. Past Surgical History:     has a past surgical history that includes [place] tooth extraction.   Current Medications:      Current Outpatient Medications:     fluconazole (DIFLUCAN) 150 MG tablet, Take 1 tablet by mouth every 72 hours for 6 days, Disp: 2 tablet, Rfl: 1    ibuprofen (ADVIL;MOTRIN) 800 MG tablet, Take 1 tablet by mouth every 8 hours as needed

## 2023-03-23 LAB
C3C SERPL-MCNC: 101 MG/DL (ref 90–180)
C4 SERPL-MCNC: 12 MG/DL (ref 10–40)

## 2023-03-25 LAB — DSDNA AB TITR SER CLIF: NORMAL {TITER}

## 2023-04-27 ENCOUNTER — TELEPHONE (OUTPATIENT)
Dept: FAMILY MEDICINE CLINIC | Age: 38
End: 2023-04-27

## 2023-04-27 ENCOUNTER — OFFICE VISIT (OUTPATIENT)
Dept: FAMILY MEDICINE CLINIC | Age: 38
End: 2023-04-27

## 2023-04-27 VITALS
WEIGHT: 112.6 LBS | DIASTOLIC BLOOD PRESSURE: 62 MMHG | HEIGHT: 60 IN | OXYGEN SATURATION: 99 % | HEART RATE: 80 BPM | SYSTOLIC BLOOD PRESSURE: 106 MMHG | BODY MASS INDEX: 22.1 KG/M2 | RESPIRATION RATE: 16 BRPM

## 2023-04-27 DIAGNOSIS — N89.8 VAGINAL DISCHARGE: ICD-10-CM

## 2023-04-27 DIAGNOSIS — R11.0 NAUSEA: ICD-10-CM

## 2023-04-27 DIAGNOSIS — B37.31 VAGINAL CANDIDIASIS: ICD-10-CM

## 2023-04-27 DIAGNOSIS — F11.20 SUBOXONE MAINTENANCE TREATMENT COMPLICATING PREGNANCY, ANTEPARTUM (HCC): ICD-10-CM

## 2023-04-27 DIAGNOSIS — R39.9 UTI SYMPTOMS: Primary | ICD-10-CM

## 2023-04-27 DIAGNOSIS — E03.9 HYPOTHYROIDISM, UNSPECIFIED TYPE: ICD-10-CM

## 2023-04-27 DIAGNOSIS — M35.9 UNDIFFERENTIATED CONNECTIVE TISSUE DISEASE (HCC): ICD-10-CM

## 2023-04-27 DIAGNOSIS — Z3A.01 LESS THAN 8 WEEKS GESTATION OF PREGNANCY: ICD-10-CM

## 2023-04-27 DIAGNOSIS — O99.320 SUBOXONE MAINTENANCE TREATMENT COMPLICATING PREGNANCY, ANTEPARTUM (HCC): ICD-10-CM

## 2023-04-27 LAB
BILIRUBIN, POC: NORMAL
BLOOD URINE, POC: NORMAL
CLARITY, POC: CLEAR
COLOR, POC: YELLOW
CONTROL: NORMAL
GLUCOSE URINE, POC: NORMAL
KETONES, POC: 15
LEUKOCYTE EST, POC: NORMAL
NITRITE, POC: NORMAL
PH, POC: 7
PREGNANCY TEST URINE, POC: POSITIVE
PROTEIN, POC: NORMAL
SPECIFIC GRAVITY, POC: 1.02
UROBILINOGEN, POC: 0.2

## 2023-04-27 RX ORDER — CEFTRIAXONE 1 G/1
1000 INJECTION, POWDER, FOR SOLUTION INTRAMUSCULAR; INTRAVENOUS ONCE
Status: COMPLETED | OUTPATIENT
Start: 2023-04-27 | End: 2023-04-27

## 2023-04-27 RX ORDER — CLOTRIMAZOLE 1 %
CREAM WITH APPLICATOR VAGINAL
Qty: 7 EACH | Refills: 0 | Status: SHIPPED | OUTPATIENT
Start: 2023-04-27 | End: 2023-05-04

## 2023-04-27 RX ORDER — FLUCONAZOLE 150 MG/1
150 TABLET ORAL ONCE
Qty: 1 TABLET | Refills: 0 | Status: CANCELLED | OUTPATIENT
Start: 2023-04-27 | End: 2023-04-27

## 2023-04-27 RX ORDER — ONDANSETRON 4 MG/1
4 TABLET, ORALLY DISINTEGRATING ORAL 3 TIMES DAILY PRN
Qty: 21 TABLET | Refills: 0 | Status: SHIPPED | OUTPATIENT
Start: 2023-04-27

## 2023-04-27 RX ORDER — CEFDINIR 300 MG/1
300 CAPSULE ORAL 2 TIMES DAILY
Qty: 14 CAPSULE | Refills: 0 | Status: SHIPPED | OUTPATIENT
Start: 2023-04-27 | End: 2023-05-04

## 2023-04-27 RX ORDER — AZITHROMYCIN 1 G
1 PACKET (EA) ORAL ONCE
Qty: 1 EACH | Refills: 0 | Status: CANCELLED | OUTPATIENT
Start: 2023-04-27 | End: 2023-04-27

## 2023-04-27 RX ORDER — CEFTRIAXONE 500 MG/1
500 INJECTION, POWDER, FOR SOLUTION INTRAMUSCULAR; INTRAVENOUS ONCE
Qty: 1 EACH | Refills: 0 | Status: CANCELLED
Start: 2023-04-27 | End: 2023-04-27

## 2023-04-27 RX ADMIN — CEFTRIAXONE 1000 MG: 1 INJECTION, POWDER, FOR SOLUTION INTRAMUSCULAR; INTRAVENOUS at 17:12

## 2023-04-27 ASSESSMENT — ENCOUNTER SYMPTOMS
VOMITING: 0
NAUSEA: 1

## 2023-04-27 NOTE — PROGRESS NOTES
Medication(s) given during visit:    Administrations This Visit       cefTRIAXone (ROCEPHIN) injection 1,000 mg       Admin Date  04/27/2023  17:12 Action  Given Dose  1,000 mg Route  IntraMUSCular Site  Dorsogluteal Right Administered By  Hortencia Magallon MA    Ordering Provider: Josh Pillai MD    NDC: 3536-5624-31    Lot#: [id number]    : RAYNA/ [employer]    Patient Supplied?: No                    Patient instructed to remain in clinic for 20 minutes after injection and was advised to report any adverse reaction to me immediately. Pt tolerated injection well.
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
## 2023-04-28 LAB
CANDIDA SPECIES, DNA PROBE: NEGATIVE
CHLAMYDIA TRACHOMATIS BY RT-PCR: NOT DETECTED
CT/NG SOURCE: NORMAL
GARDNERELLA VAGINALIS, DNA PROBE: NEGATIVE
NEISSERIA GONORRHOEAE BY RT-PCR: NOT DETECTED
SOURCE: NORMAL
TRICHOMONAS VAGINALIS DNA: NEGATIVE

## 2023-04-29 LAB
BACTERIA UR CULT: ABNORMAL
ORGANISM: ABNORMAL

## 2023-05-01 ENCOUNTER — OFFICE VISIT (OUTPATIENT)
Dept: FAMILY MEDICINE CLINIC | Age: 38
End: 2023-05-01
Payer: MEDICAID

## 2023-05-01 ENCOUNTER — TELEPHONE (OUTPATIENT)
Dept: FAMILY MEDICINE CLINIC | Age: 38
End: 2023-05-01

## 2023-05-01 VITALS
RESPIRATION RATE: 16 BRPM | OXYGEN SATURATION: 98 % | SYSTOLIC BLOOD PRESSURE: 116 MMHG | DIASTOLIC BLOOD PRESSURE: 70 MMHG | HEART RATE: 70 BPM

## 2023-05-01 DIAGNOSIS — O99.320 SUBOXONE MAINTENANCE TREATMENT COMPLICATING PREGNANCY, ANTEPARTUM (HCC): ICD-10-CM

## 2023-05-01 DIAGNOSIS — O26.891 VAGINAL DISCHARGE DURING PREGNANCY IN FIRST TRIMESTER: ICD-10-CM

## 2023-05-01 DIAGNOSIS — Z3A.01 LESS THAN 8 WEEKS GESTATION OF PREGNANCY: ICD-10-CM

## 2023-05-01 DIAGNOSIS — R11.0 NAUSEA: ICD-10-CM

## 2023-05-01 DIAGNOSIS — R39.9 UTI SYMPTOMS: Primary | ICD-10-CM

## 2023-05-01 DIAGNOSIS — F11.20 SUBOXONE MAINTENANCE TREATMENT COMPLICATING PREGNANCY, ANTEPARTUM (HCC): ICD-10-CM

## 2023-05-01 DIAGNOSIS — N89.8 VAGINAL DISCHARGE: ICD-10-CM

## 2023-05-01 DIAGNOSIS — N89.8 VAGINAL DISCHARGE DURING PREGNANCY IN FIRST TRIMESTER: ICD-10-CM

## 2023-05-01 DIAGNOSIS — E03.9 HYPOTHYROIDISM, UNSPECIFIED TYPE: ICD-10-CM

## 2023-05-01 LAB
BASOPHILS ABSOLUTE: 0 THOU/MM3 (ref 0–0.1)
BASOPHILS NFR BLD AUTO: 0.5 %
DEPRECATED RDW RBC AUTO: 44.2 FL (ref 35–45)
EOSINOPHIL NFR BLD AUTO: 2.1 %
EOSINOPHILS ABSOLUTE: 0.1 THOU/MM3 (ref 0–0.4)
ERYTHROCYTE [DISTWIDTH] IN BLOOD BY AUTOMATED COUNT: 12.3 % (ref 11.5–14.5)
HCT VFR BLD AUTO: 39.7 % (ref 37–47)
HGB BLD-MCNC: 12.4 GM/DL (ref 12–16)
IMM GRANULOCYTES # BLD AUTO: 0.01 THOU/MM3 (ref 0–0.07)
IMM GRANULOCYTES NFR BLD AUTO: 0.2 %
LYMPHOCYTES ABSOLUTE: 1.1 THOU/MM3 (ref 1–4.8)
LYMPHOCYTES NFR BLD AUTO: 17 %
MCH RBC QN AUTO: 30.4 PG (ref 26–33)
MCHC RBC AUTO-ENTMCNC: 31.2 GM/DL (ref 32.2–35.5)
MCV RBC AUTO: 97.3 FL (ref 81–99)
MONOCYTES ABSOLUTE: 0.5 THOU/MM3 (ref 0.4–1.3)
MONOCYTES NFR BLD AUTO: 7.4 %
NEUTROPHILS NFR BLD AUTO: 72.8 %
NRBC BLD AUTO-RTO: 0 /100 WBC
PLATELET # BLD AUTO: 294 THOU/MM3 (ref 130–400)
PMV BLD AUTO: 9.2 FL (ref 9.4–12.4)
RBC # BLD AUTO: 4.08 MILL/MM3 (ref 4.2–5.4)
SEGMENTED NEUTROPHILS ABSOLUTE COUNT: 4.7 THOU/MM3 (ref 1.8–7.7)
WBC # BLD AUTO: 6.5 THOU/MM3 (ref 4.8–10.8)

## 2023-05-01 PROCEDURE — 99214 OFFICE O/P EST MOD 30 MIN: CPT | Performed by: FAMILY MEDICINE

## 2023-05-01 PROCEDURE — 36415 COLL VENOUS BLD VENIPUNCTURE: CPT | Performed by: FAMILY MEDICINE

## 2023-05-01 RX ORDER — LEVOTHYROXINE SODIUM 0.07 MG/1
TABLET ORAL
Qty: 30 TABLET | Status: CANCELLED | OUTPATIENT
Start: 2023-05-01

## 2023-05-01 RX ORDER — PYRIDOXINE HCL (VITAMIN B6) 25 MG
12.5 TABLET ORAL EVERY 6 HOURS PRN
Qty: 60 TABLET | Refills: 5 | Status: SHIPPED | OUTPATIENT
Start: 2023-05-01

## 2023-05-01 RX ORDER — LEVOTHYROXINE SODIUM 0.07 MG/1
TABLET ORAL
COMMUNITY
End: 2023-05-01

## 2023-05-01 ASSESSMENT — ENCOUNTER SYMPTOMS
COUGH: 0
BACK PAIN: 1
VOMITING: 0
NAUSEA: 1

## 2023-05-01 NOTE — PROGRESS NOTES
Patient declined to see resident.
Venipuncture obtained from  right arm. Patient tolerated the procedure without complications or complaints.
once daily along with the 125 mcg, Disp: , Rfl:     pyridoxine (B-6) 25 MG tablet, Take 0.5 tablets by mouth every 6 hours as needed (nausea), Disp: 60 tablet, Rfl: 5    cefdinir (OMNICEF) 300 MG capsule, Take 1 capsule by mouth 2 times daily for 7 days, Disp: 14 capsule, Rfl: 0    clotrimazole (LOTRIMIN) 1 % vaginal cream, Place vaginally at bedtime daily for 1 week., Disp: 7 each, Rfl: 0    levothyroxine (SYNTHROID) 125 MCG tablet, Take 1 tablet by mouth daily, Disp: 90 tablet, Rfl: 1    buPROPion (WELLBUTRIN XL) 300 MG extended release tablet, Take 1 tablet by mouth every morning, Disp: 90 tablet, Rfl: 1    ondansetron (ZOFRAN-ODT) 4 MG disintegrating tablet, Take 1 tablet by mouth every 8 hours as needed for Nausea or Vomiting, Disp: 21 tablet, Rfl: 0    acetaminophen (TYLENOL) 325 MG tablet, Take 2 tablets by mouth every 6 hours as needed for Pain, Disp: , Rfl:     SUBOXONE 8-2 MG FILM SL film, , Disp: , Rfl: 0    ondansetron (ZOFRAN-ODT) 4 MG disintegrating tablet, Take 1 tablet by mouth 3 times daily as needed for Nausea or Vomiting, Disp: 21 tablet, Rfl: 0    ibuprofen (ADVIL;MOTRIN) 800 MG tablet, Take 1 tablet by mouth every 8 hours as needed for Pain (Patient not taking: Reported on 5/1/2023), Disp: 90 tablet, Rfl: 3    Multiple Vitamins-Minerals (MULTIVITAMIN WOMEN) TABS, Take by mouth (Patient not taking: Reported on 5/1/2023), Disp: , Rfl:     Subjective:      Review of Systems   Constitutional:  Positive for fatigue. Negative for chills and fever. Respiratory:  Negative for cough. Cardiovascular:  Negative for chest pain and palpitations (heart beats a bit harder when lying down). Gastrointestinal:  Positive for nausea (better; just when lying down). Negative for vomiting. Genitourinary:  Negative for dysuria, flank pain, frequency, hematuria, urgency and vaginal bleeding (trace brownish discharge).    Musculoskeletal:  Positive for back pain (mild but markedly improved; had this with last

## 2023-05-01 NOTE — TELEPHONE ENCOUNTER
Seen today and discussed but realized I am a bit unclear on her exact regimen with 2 doses listed on medication list. Called again to verify.

## 2023-05-01 NOTE — TELEPHONE ENCOUNTER
Called patient to verify exact dose of thyroid medication and let her know I also called in B6 for nausea. Await call back.

## 2023-05-02 LAB
ABO: NORMAL
ALBUMIN SERPL BCG-MCNC: 4.1 G/DL (ref 3.5–5.1)
ALP SERPL-CCNC: 45 U/L (ref 38–126)
ALT SERPL W/O P-5'-P-CCNC: 10 U/L (ref 11–66)
ANION GAP SERPL CALC-SCNC: 9 MEQ/L (ref 8–16)
AST SERPL-CCNC: 11 U/L (ref 5–40)
BILIRUB SERPL-MCNC: < 0.2 MG/DL (ref 0.3–1.2)
BUN SERPL-MCNC: 19 MG/DL (ref 7–22)
CALCIUM SERPL-MCNC: 9.2 MG/DL (ref 8.5–10.5)
CANDIDA SPECIES, DNA PROBE: NEGATIVE
CHLORIDE SERPL-SCNC: 104 MEQ/L (ref 98–111)
CO2 SERPL-SCNC: 24 MEQ/L (ref 23–33)
CREAT SERPL-MCNC: 0.7 MG/DL (ref 0.4–1.2)
GARDNERELLA VAGINALIS, DNA PROBE: NEGATIVE
GFR SERPL CREATININE-BSD FRML MDRD: > 60 ML/MIN/1.73M2
GLUCOSE SERPL-MCNC: 99 MG/DL (ref 70–108)
HBV CORE IGG+IGM SERPL QL IA: NONREACTIVE
HBV CORE IGM SERPL QL IA: NEGATIVE
HBV SURFACE AB SER QL IA: NEGATIVE
HBV SURFACE AG SERPL QL IA: NEGATIVE
HCV IGG SERPL QL IA: NEGATIVE
HIV 1+2 AB+HIV1 P24 AG SERPL QL IA: NONREACTIVE
POTASSIUM SERPL-SCNC: 3.9 MEQ/L (ref 3.5–5.2)
PROT SERPL-MCNC: 6.4 G/DL (ref 6.1–8)
RH FACTOR: NORMAL
RPR SER QL: NONREACTIVE
SODIUM SERPL-SCNC: 137 MEQ/L (ref 135–145)
SOURCE: NORMAL
SPECIMEN SOURCE: NORMAL
TRICHOMONAS VAGINALIS DNA: NEGATIVE
TSH SERPL DL<=0.005 MIU/L-ACNC: 2.39 UIU/ML (ref 0.4–4.2)

## 2023-05-02 NOTE — TELEPHONE ENCOUNTER
At her appointment yesterday she told me she is taking one of the synthroid 125 mcg and half of a 75 mcg tablet at the same time. She states that she thought the nurse that called her the other day wanted her to take two of the synthroid 125 mcg- and that made her nervous. When patient calls  She should take 9 doses a week (so 2 pills a day on 2 days a week - Monday and & Thursday and one pill a day the other days). We will need to monitor TSH closely.

## 2023-05-03 ENCOUNTER — HOSPITAL ENCOUNTER (OUTPATIENT)
Dept: ULTRASOUND IMAGING | Age: 38
Discharge: HOME | End: 2023-05-03
Payer: MEDICAID

## 2023-05-03 DIAGNOSIS — Z3A.01 LESS THAN 8 WEEKS GESTATION OF PREGNANCY: ICD-10-CM

## 2023-05-03 LAB — VZV IGG SER QL IA: 2.03

## 2023-05-03 PROCEDURE — 76817 TRANSVAGINAL US OBSTETRIC: CPT

## 2023-05-03 NOTE — TELEPHONE ENCOUNTER
Spoke with patient- like I states before she was currently taking 125 mcg- when received call from N she misunderstood and was taking synthroid 125 mcg and 1/2 of synthroid 75 mcg daily. Advised patient to take 9 doses a week (so 2 pills a day on 2 days a week - Monday and & Thursday and one pill all the other days). We will need to monitor TSH closely. Patient notified and voiced understanding.  No questions at this time

## 2023-05-04 ENCOUNTER — TELEPHONE (OUTPATIENT)
Dept: FAMILY MEDICINE CLINIC | Age: 38
End: 2023-05-04

## 2023-05-04 DIAGNOSIS — B37.31 VAGINAL CANDIDIASIS: ICD-10-CM

## 2023-05-04 LAB
BACTERIA GENITAL AEROBE CULT: ABNORMAL
BACTERIA GENITAL AEROBE CULT: ABNORMAL
GRAM STN GENITAL: ABNORMAL
ORGANISM: ABNORMAL
RUBV IGG SER-ACNC: 9.4 IU/ML

## 2023-05-04 RX ORDER — CLOTRIMAZOLE 1 %
CREAM WITH APPLICATOR VAGINAL
Qty: 7 EACH | Refills: 0 | Status: SHIPPED | OUTPATIENT
Start: 2023-05-04 | End: 2023-05-11

## 2023-05-04 NOTE — TELEPHONE ENCOUNTER
Spoke with Benny in lab- she states that the mycoplasm is still pending- states that the order was placed correct

## 2023-05-05 ENCOUNTER — TELEPHONE (OUTPATIENT)
Dept: FAMILY MEDICINE CLINIC | Age: 38
End: 2023-05-05

## 2023-05-09 LAB — MISC. #1 REFERENCE GROUP TEST: NORMAL

## 2023-05-09 NOTE — TELEPHONE ENCOUNTER
Aware. Please call to touch base and see how Geovanny Wills is doing. If vaginal bleeding stopped and feeling well fine to follow-up as scheduled. If feeling unwell please let me know and I will call her to make a plan. Thanks.

## 2023-05-11 ENCOUNTER — TELEPHONE (OUTPATIENT)
Dept: FAMILY MEDICINE CLINIC | Age: 38
End: 2023-05-11

## 2023-05-11 NOTE — TELEPHONE ENCOUNTER
----- Message from Fabricio Donahue MD sent at 5/10/2023 11:07 AM EDT -----  Good news negative test for a sexually transmitted infection.

## 2023-05-22 ENCOUNTER — OFFICE VISIT (OUTPATIENT)
Dept: FAMILY MEDICINE CLINIC | Age: 38
End: 2023-05-22
Payer: MEDICAID

## 2023-05-22 VITALS
SYSTOLIC BLOOD PRESSURE: 118 MMHG | DIASTOLIC BLOOD PRESSURE: 78 MMHG | OXYGEN SATURATION: 99 % | RESPIRATION RATE: 16 BRPM | WEIGHT: 115.2 LBS | HEIGHT: 60 IN | BODY MASS INDEX: 22.62 KG/M2 | HEART RATE: 71 BPM

## 2023-05-22 DIAGNOSIS — R00.2 PALPITATIONS: ICD-10-CM

## 2023-05-22 DIAGNOSIS — E03.9 HYPOTHYROIDISM, UNSPECIFIED TYPE: Primary | ICD-10-CM

## 2023-05-22 DIAGNOSIS — R11.0 NAUSEA: ICD-10-CM

## 2023-05-22 DIAGNOSIS — K59.00 CONSTIPATION, UNSPECIFIED CONSTIPATION TYPE: ICD-10-CM

## 2023-05-22 DIAGNOSIS — R30.0 BURNING WITH URINATION: ICD-10-CM

## 2023-05-22 DIAGNOSIS — Z3A.10 10 WEEKS GESTATION OF PREGNANCY: ICD-10-CM

## 2023-05-22 LAB
BILIRUBIN, POC: NEGATIVE
BLOOD URINE, POC: NEGATIVE
CLARITY, POC: CLEAR
COLOR, POC: YELLOW
GLUCOSE URINE, POC: NEGATIVE
KETONES, POC: NEGATIVE
LEUKOCYTE EST, POC: NEGATIVE
NITRITE, POC: NEGATIVE
PH, POC: 6.5
PROTEIN, POC: NEGATIVE
SPECIFIC GRAVITY, POC: 1.02
UROBILINOGEN, POC: 0.2

## 2023-05-22 PROCEDURE — 81003 URINALYSIS AUTO W/O SCOPE: CPT | Performed by: FAMILY MEDICINE

## 2023-05-22 PROCEDURE — 93000 ELECTROCARDIOGRAM COMPLETE: CPT | Performed by: FAMILY MEDICINE

## 2023-05-22 PROCEDURE — 99214 OFFICE O/P EST MOD 30 MIN: CPT | Performed by: FAMILY MEDICINE

## 2023-05-22 RX ORDER — PRENATAL WITH FERROUS FUM AND FOLIC ACID 3080; 920; 120; 400; 22; 1.84; 3; 20; 10; 1; 12; 200; 27; 25; 2 [IU]/1; [IU]/1; MG/1; [IU]/1; MG/1; MG/1; MG/1; MG/1; MG/1; MG/1; UG/1; MG/1; MG/1; MG/1; MG/1
1 TABLET ORAL DAILY
COMMUNITY
Start: 2023-05-04

## 2023-05-22 RX ORDER — BUPRENORPHINE HYDROCHLORIDE 8 MG/1
TABLET SUBLINGUAL
COMMUNITY
Start: 2023-05-18

## 2023-05-22 RX ORDER — CYANOCOBALAMIN (VITAMIN B-12) 500 MCG
800 TABLET ORAL DAILY
Qty: 90 CAPSULE | Refills: 1 | Status: SHIPPED | OUTPATIENT
Start: 2023-05-22

## 2023-05-22 RX ORDER — ONDANSETRON 4 MG/1
4 TABLET, ORALLY DISINTEGRATING ORAL 3 TIMES DAILY PRN
Qty: 21 TABLET | Refills: 0 | Status: SHIPPED | OUTPATIENT
Start: 2023-05-22

## 2023-05-22 SDOH — ECONOMIC STABILITY: FOOD INSECURITY: WITHIN THE PAST 12 MONTHS, YOU WORRIED THAT YOUR FOOD WOULD RUN OUT BEFORE YOU GOT MONEY TO BUY MORE.: NEVER TRUE

## 2023-05-22 SDOH — ECONOMIC STABILITY: INCOME INSECURITY: HOW HARD IS IT FOR YOU TO PAY FOR THE VERY BASICS LIKE FOOD, HOUSING, MEDICAL CARE, AND HEATING?: NOT HARD AT ALL

## 2023-05-22 SDOH — ECONOMIC STABILITY: FOOD INSECURITY: WITHIN THE PAST 12 MONTHS, THE FOOD YOU BOUGHT JUST DIDN'T LAST AND YOU DIDN'T HAVE MONEY TO GET MORE.: NEVER TRUE

## 2023-05-22 SDOH — ECONOMIC STABILITY: HOUSING INSECURITY
IN THE LAST 12 MONTHS, WAS THERE A TIME WHEN YOU DID NOT HAVE A STEADY PLACE TO SLEEP OR SLEPT IN A SHELTER (INCLUDING NOW)?: NO

## 2023-05-22 ASSESSMENT — ENCOUNTER SYMPTOMS
SORE THROAT: 0
COUGH: 0
EYE REDNESS: 0
CONSTIPATION: 1
ABDOMINAL PAIN: 0
VOMITING: 1
EYE DISCHARGE: 0
DIARRHEA: 0
NAUSEA: 1
SHORTNESS OF BREATH: 0
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
## 2023-05-24 LAB
BACTERIA UR CULT: ABNORMAL
ORGANISM: ABNORMAL

## 2023-05-25 ENCOUNTER — NURSE ONLY (OUTPATIENT)
Dept: FAMILY MEDICINE CLINIC | Age: 38
End: 2023-05-25
Payer: MEDICAID

## 2023-05-25 DIAGNOSIS — E03.9 HYPOTHYROIDISM, UNSPECIFIED TYPE: ICD-10-CM

## 2023-05-25 PROCEDURE — 36415 COLL VENOUS BLD VENIPUNCTURE: CPT | Performed by: FAMILY MEDICINE

## 2023-05-25 RX ORDER — LEVOTHYROXINE SODIUM 0.12 MG/1
TABLET ORAL
Qty: 114 TABLET | Refills: 1 | Status: SHIPPED | OUTPATIENT
Start: 2023-05-25

## 2023-05-25 NOTE — TELEPHONE ENCOUNTER
Patient calling in and is almost out of synthroid- will be out by weekend- advised to stop up to office today to have labs drawn.

## 2023-05-26 LAB — TSH SERPL DL<=0.005 MIU/L-ACNC: 0.32 UIU/ML (ref 0.4–4.2)

## 2023-05-27 LAB — T4 SERPL-MCNC: 13.8 UG/DL (ref 4.5–10.9)

## 2023-05-30 ENCOUNTER — TELEPHONE (OUTPATIENT)
Dept: FAMILY MEDICINE CLINIC | Age: 38
End: 2023-05-30

## 2023-05-30 NOTE — TELEPHONE ENCOUNTER
Patient is asking if you would still like her to have the EKG and Holter monitor completed now or hold off and see how things go. She states that you both believe it was because of her thyroid being out of wack.     Please advise

## 2023-06-02 NOTE — TELEPHONE ENCOUNTER
Patient notified and voiced understanding- central scheduling number given- would like to have done at [facility]

## 2023-09-17 DIAGNOSIS — R41.840 ATTENTION AND CONCENTRATION DEFICIT: ICD-10-CM

## 2023-09-18 NOTE — TELEPHONE ENCOUNTER
Patient's last appointment was : 5/22/2023  Patient's next appointment is : Visit date not found  Last refilled:8/20/23, 390, 1 refill

## 2023-09-20 RX ORDER — BUPROPION HYDROCHLORIDE 300 MG/1
300 TABLET ORAL EVERY MORNING
Qty: 90 TABLET | Refills: 0 | Status: SHIPPED | OUTPATIENT
Start: 2023-09-20

## 2023-11-26 DIAGNOSIS — E03.9 HYPOTHYROIDISM, UNSPECIFIED TYPE: ICD-10-CM

## 2023-11-26 DIAGNOSIS — Z3A.10 10 WEEKS GESTATION OF PREGNANCY: ICD-10-CM

## 2023-11-26 DIAGNOSIS — R11.0 NAUSEA: ICD-10-CM

## 2023-11-27 NOTE — TELEPHONE ENCOUNTER
Patient's last appointment was : 5/22/2023  Patient's next appointment is : Visit date not found  Last refilled:levothyroxine 5/25/23, #114, 1 refill; Folic acid 1/69/83, #82, 1 refill    Unable to reach patient in September. Letter was sent to home to schedule F/U appointment- appointment not scheduled.

## 2023-11-28 RX ORDER — LEVOTHYROXINE SODIUM 0.12 MG/1
TABLET ORAL
Qty: 108 TABLET | Refills: 2 | OUTPATIENT
Start: 2023-11-28

## 2023-11-28 RX ORDER — UREA 10 %
800 LOTION (ML) TOPICAL DAILY
Qty: 90 TABLET | Refills: 1 | OUTPATIENT
Start: 2023-11-28

## 2023-11-28 NOTE — TELEPHONE ENCOUNTER
Please call patient to touch base and see if refill is needed? Perhaps OB is managing? Thanks. If refill needed, I will send in but needs labs. Thanks.

## 2024-09-04 ENCOUNTER — HOSPITAL ENCOUNTER (EMERGENCY)
Age: 39
Discharge: HOME | End: 2024-09-04
Payer: MEDICAID

## 2024-09-04 VITALS
HEART RATE: 75 BPM | TEMPERATURE: 98 F | SYSTOLIC BLOOD PRESSURE: 117 MMHG | OXYGEN SATURATION: 100 % | RESPIRATION RATE: 16 BRPM | DIASTOLIC BLOOD PRESSURE: 79 MMHG

## 2024-09-04 DIAGNOSIS — B02.9 HERPES ZOSTER WITHOUT COMPLICATION: Primary | ICD-10-CM

## 2024-09-04 PROCEDURE — 99283 EMERGENCY DEPT VISIT LOW MDM: CPT

## 2024-09-04 RX ORDER — VALACYCLOVIR HYDROCHLORIDE 1 G/1
1000 TABLET, FILM COATED ORAL 3 TIMES DAILY
Qty: 21 TABLET | Refills: 0 | Status: SHIPPED | OUTPATIENT
Start: 2024-09-04 | End: 2024-09-11

## 2024-09-04 ASSESSMENT — PAIN - FUNCTIONAL ASSESSMENT: PAIN_FUNCTIONAL_ASSESSMENT: NONE - DENIES PAIN

## 2024-09-04 NOTE — ED PROVIDER NOTES
Rash present. Rash is vesicular.             Comments: Patient has a linear dermatomal pattern vesicular patchy red rash along the left chest wall just under the breast.  It does burn.  There is no signs of cellulitis no discharge or purulent drainage.   Neurological:      General: No focal deficit present.      Mental Status: She is alert and oriented to person, place, and time.   Psychiatric:         Mood and Affect: Mood normal.         Behavior: Behavior normal.         Thought Content: Thought content normal.         Judgment: Judgment normal.           MEDICAL DECISION MAKING:  Maru Crabtree is a 39 y.o. female who presents to the emergency dept  with a rash on the left side of her chest.  She states it was burning she took Tylenol this morning which did make it feel a lot better.  Patient is afebrile.  She denies any chest pain or shortness of breath.  No nausea or vomiting patient does breast-feed her 8-month-old child.  She has no other symptoms at this time.    Patient has a durable total vesicular patchy red rash along the left chest wall just under the breast.  Patient has evidence of shingles.  The remainder of her exam is negative.  Patient will be placed on Valacyclovir. he was encouraged to keep the area covered and follow-up with her PCP.  Patient will be discharged home in stable condition      Functioning independently as a Mid-Level Practitioner, I have fully participated in the care of this patient and I have reviewed and agree with all pertinent clinical information including history, physical exam, and plan. I have also reviewed and agree with the medications, allergies and past medical history section for this patient.    Nurses Notes reviewed and I agree except as noted in the HPI.    DIAGNOSTIC RESULTS      RADIOLOGY:   No orders to display       LABS:  Labs Reviewed - No data to display    VISIT COURSE:  Vitals:    Vitals:    09/04/24 1516   BP: 117/79   Pulse: 75   Resp: 16   Temp: 98 °F

## 2024-09-04 NOTE — ED NOTES
Pt arrives to ED from home with c/o possible yeast infection under her left breast, pt reports she has been treated in the past, also reports extensive medical history   Pt is breast feeding

## 2024-12-03 ENCOUNTER — HOSPITAL ENCOUNTER (EMERGENCY)
Age: 39
Discharge: HOME | End: 2024-12-03
Payer: MEDICAID

## 2024-12-03 VITALS
TEMPERATURE: 98.4 F | OXYGEN SATURATION: 98 % | HEART RATE: 68 BPM | HEIGHT: 60 IN | BODY MASS INDEX: 22.97 KG/M2 | SYSTOLIC BLOOD PRESSURE: 113 MMHG | DIASTOLIC BLOOD PRESSURE: 57 MMHG | RESPIRATION RATE: 17 BRPM | WEIGHT: 117 LBS

## 2024-12-03 DIAGNOSIS — N39.0 URINARY TRACT INFECTION WITHOUT HEMATURIA, SITE UNSPECIFIED: Primary | ICD-10-CM

## 2024-12-03 DIAGNOSIS — B37.9 CANDIDIASIS: ICD-10-CM

## 2024-12-03 LAB
BACTERIA URNS QL MICRO: ABNORMAL /HPF
BILIRUB UR QL STRIP.AUTO: NEGATIVE
CASTS #/AREA URNS LPF: ABNORMAL /LPF
CASTS 2: ABNORMAL /LPF
CHARACTER UR: ABNORMAL
COLOR, UA: YELLOW
CRYSTALS URNS MICRO: ABNORMAL
EPITHELIAL CELLS, UA: ABNORMAL /HPF
GLUCOSE UR QL STRIP.AUTO: NEGATIVE MG/DL
HGB UR QL STRIP.AUTO: NEGATIVE
KETONES UR QL STRIP.AUTO: NEGATIVE
MISCELLANEOUS 2: ABNORMAL
NITRITE UR QL STRIP: NEGATIVE
PH UR STRIP.AUTO: 6 [PH] (ref 5–9)
PROT UR STRIP.AUTO-MCNC: ABNORMAL MG/DL
RBC URINE: ABNORMAL /HPF
RENAL EPI CELLS #/AREA URNS HPF: ABNORMAL /[HPF]
SP GR UR REFRACT.AUTO: 1.03 (ref 1–1.03)
UROBILINOGEN, URINE: 0.2 EU/DL (ref 0–1)
WBC #/AREA URNS HPF: ABNORMAL /HPF
WBC #/AREA URNS HPF: ABNORMAL /[HPF]
YEAST LIKE FUNGI URNS QL MICRO: ABNORMAL

## 2024-12-03 PROCEDURE — 99283 EMERGENCY DEPT VISIT LOW MDM: CPT

## 2024-12-03 PROCEDURE — 81001 URINALYSIS AUTO W/SCOPE: CPT

## 2024-12-03 PROCEDURE — 87086 URINE CULTURE/COLONY COUNT: CPT

## 2024-12-03 RX ORDER — NYSTATIN 100000 [USP'U]/G
POWDER TOPICAL
Qty: 60 G | Refills: 0 | Status: SHIPPED | OUTPATIENT
Start: 2024-12-03

## 2024-12-03 RX ORDER — NITROFURANTOIN 25; 75 MG/1; MG/1
100 CAPSULE ORAL 2 TIMES DAILY
Qty: 20 CAPSULE | Refills: 0 | Status: SHIPPED | OUTPATIENT
Start: 2024-12-03 | End: 2024-12-13

## 2024-12-03 ASSESSMENT — PAIN - FUNCTIONAL ASSESSMENT: PAIN_FUNCTIONAL_ASSESSMENT: 0-10

## 2024-12-03 ASSESSMENT — PAIN DESCRIPTION - ORIENTATION: ORIENTATION: LEFT;RIGHT

## 2024-12-03 ASSESSMENT — PAIN SCALES - GENERAL: PAINLEVEL_OUTOF10: 2

## 2024-12-03 NOTE — ED TRIAGE NOTES
Pt presents to the ER with c/o bilateral breast pain. Pt breastfeeds and she believes the child has thrush and \"may have transferred to her breast.\" Pt also believes she may have a UTI d/t dysuria and flank pain. Pt is alert and oriented, VSS

## 2024-12-04 LAB
BACTERIA UR CULT: ABNORMAL
ORGANISM: ABNORMAL

## 2024-12-13 NOTE — ED PROVIDER NOTES
addressed in this visit.    Note to patient: The Cares Act makes medical notes like these available to patients in the interest of transparency. However, be advised this is a medical document. It is intended as peer to peer communication. It is written in medical language and may contain abbreviations or verbiage that are unfamiliar. It may appear blunt or direct. Medical documents are intended to carry relevant information, facts as evident, and the clinical opinion of the practitioner.        Petar Yarbrough PA-C  12/13/24 0644